# Patient Record
Sex: FEMALE | Race: BLACK OR AFRICAN AMERICAN | NOT HISPANIC OR LATINO
[De-identification: names, ages, dates, MRNs, and addresses within clinical notes are randomized per-mention and may not be internally consistent; named-entity substitution may affect disease eponyms.]

---

## 2017-01-04 ENCOUNTER — APPOINTMENT (OUTPATIENT)
Dept: BREAST CENTER | Facility: CLINIC | Age: 57
End: 2017-01-04

## 2017-01-04 VITALS — DIASTOLIC BLOOD PRESSURE: 86 MMHG | SYSTOLIC BLOOD PRESSURE: 142 MMHG | HEIGHT: 67 IN | HEART RATE: 78 BPM

## 2017-01-04 DIAGNOSIS — Z86.79 PERSONAL HISTORY OF OTHER DISEASES OF THE CIRCULATORY SYSTEM: ICD-10-CM

## 2017-01-04 DIAGNOSIS — Z15.01 GENETIC SUSCEPTIBILITY TO OTHER DISEASE: ICD-10-CM

## 2017-01-04 DIAGNOSIS — Z15.89 GENETIC SUSCEPTIBILITY TO OTHER DISEASE: ICD-10-CM

## 2017-01-04 DIAGNOSIS — N60.02 SOLITARY CYST OF LEFT BREAST: ICD-10-CM

## 2017-01-04 DIAGNOSIS — Z86.010 PERSONAL HISTORY OF COLONIC POLYPS: ICD-10-CM

## 2017-01-04 RX ORDER — LEVOTHYROXINE SODIUM 137 UG/1
137 TABLET ORAL
Refills: 0 | Status: ACTIVE | COMMUNITY

## 2017-01-04 RX ORDER — DICLOFENAC SODIUM 75 MG/1
75 TABLET, DELAYED RELEASE ORAL
Refills: 0 | Status: ACTIVE | COMMUNITY

## 2017-01-04 RX ORDER — PHENTERMINE HYDROCHLORIDE 30 MG/1
30 CAPSULE ORAL
Refills: 0 | Status: ACTIVE | COMMUNITY

## 2017-01-04 RX ORDER — AMLODIPINE BESYLATE 2.5 MG/1
2.5 TABLET ORAL
Refills: 0 | Status: ACTIVE | COMMUNITY

## 2017-01-09 ENCOUNTER — OTHER (OUTPATIENT)
Age: 57
End: 2017-01-09

## 2017-01-09 DIAGNOSIS — R92.8 OTHER ABNORMAL AND INCONCLUSIVE FINDINGS ON DIAGNOSTIC IMAGING OF BREAST: ICD-10-CM

## 2017-01-13 ENCOUNTER — OTHER (OUTPATIENT)
Age: 57
End: 2017-01-13

## 2017-01-17 ENCOUNTER — RESULT REVIEW (OUTPATIENT)
Age: 57
End: 2017-01-17

## 2017-01-19 ENCOUNTER — OTHER (OUTPATIENT)
Age: 57
End: 2017-01-19

## 2017-01-19 DIAGNOSIS — N28.89 OTHER SPECIFIED DISORDERS OF KIDNEY AND URETER: ICD-10-CM

## 2017-01-23 ENCOUNTER — OTHER (OUTPATIENT)
Age: 57
End: 2017-01-23

## 2017-01-25 ENCOUNTER — OUTPATIENT (OUTPATIENT)
Dept: OUTPATIENT SERVICES | Facility: HOSPITAL | Age: 57
LOS: 1 days | Discharge: HOME | End: 2017-01-25
Payer: COMMERCIAL

## 2017-01-25 ENCOUNTER — CLINICAL ADVICE (OUTPATIENT)
Age: 57
End: 2017-01-25

## 2017-01-25 DIAGNOSIS — N60.02 SOLITARY CYST OF LEFT BREAST: ICD-10-CM

## 2017-01-25 PROCEDURE — 93010 ELECTROCARDIOGRAM REPORT: CPT

## 2017-02-03 ENCOUNTER — OTHER (OUTPATIENT)
Age: 57
End: 2017-02-03

## 2017-02-03 DIAGNOSIS — K76.89 OTHER SPECIFIED DISEASES OF LIVER: ICD-10-CM

## 2017-02-03 DIAGNOSIS — K76.9 LIVER DISEASE, UNSPECIFIED: ICD-10-CM

## 2017-02-03 RX ORDER — FAMOTIDINE 10 MG/ML
20 INJECTION INTRAVENOUS ONCE
Qty: 0 | Refills: 0 | Status: COMPLETED | OUTPATIENT
Start: 2017-02-06 | End: 2017-02-06

## 2017-02-05 ENCOUNTER — RESULT REVIEW (OUTPATIENT)
Age: 57
End: 2017-02-05

## 2017-02-06 ENCOUNTER — OUTPATIENT (OUTPATIENT)
Dept: OUTPATIENT SERVICES | Facility: HOSPITAL | Age: 57
LOS: 1 days | Discharge: HOME | End: 2017-02-06
Payer: COMMERCIAL

## 2017-02-06 ENCOUNTER — APPOINTMENT (OUTPATIENT)
Dept: BREAST CENTER | Facility: HOSPITAL | Age: 57
End: 2017-02-06

## 2017-02-06 VITALS
HEIGHT: 67 IN | TEMPERATURE: 98 F | HEART RATE: 72 BPM | SYSTOLIC BLOOD PRESSURE: 137 MMHG | OXYGEN SATURATION: 98 % | DIASTOLIC BLOOD PRESSURE: 81 MMHG | WEIGHT: 220.46 LBS | RESPIRATION RATE: 16 BRPM

## 2017-02-06 VITALS
OXYGEN SATURATION: 98 % | HEART RATE: 85 BPM | DIASTOLIC BLOOD PRESSURE: 85 MMHG | RESPIRATION RATE: 15 BRPM | TEMPERATURE: 99 F | SYSTOLIC BLOOD PRESSURE: 146 MMHG

## 2017-02-06 DIAGNOSIS — M19.90 UNSPECIFIED OSTEOARTHRITIS, UNSPECIFIED SITE: ICD-10-CM

## 2017-02-06 DIAGNOSIS — E89.0 POSTPROCEDURAL HYPOTHYROIDISM: Chronic | ICD-10-CM

## 2017-02-06 DIAGNOSIS — I10 ESSENTIAL (PRIMARY) HYPERTENSION: ICD-10-CM

## 2017-02-06 DIAGNOSIS — Z98.890 OTHER SPECIFIED POSTPROCEDURAL STATES: Chronic | ICD-10-CM

## 2017-02-06 DIAGNOSIS — E78.00 PURE HYPERCHOLESTEROLEMIA, UNSPECIFIED: ICD-10-CM

## 2017-02-06 DIAGNOSIS — N60.09 SOLITARY CYST OF UNSPECIFIED BREAST: ICD-10-CM

## 2017-02-06 DIAGNOSIS — E03.9 HYPOTHYROIDISM, UNSPECIFIED: ICD-10-CM

## 2017-02-06 PROCEDURE — 88307 TISSUE EXAM BY PATHOLOGIST: CPT | Mod: 26

## 2017-02-06 RX ORDER — ONDANSETRON 8 MG/1
4 TABLET, FILM COATED ORAL EVERY 6 HOURS
Qty: 0 | Refills: 0 | Status: DISCONTINUED | OUTPATIENT
Start: 2017-02-06 | End: 2017-02-21

## 2017-02-06 RX ADMIN — FAMOTIDINE 20 MILLIGRAM(S): 10 INJECTION INTRAVENOUS at 08:11

## 2017-02-06 NOTE — ASU DISCHARGE PLAN (ADULT/PEDIATRIC). - NOTIFY
Fever greater than 101/Inability to Tolerate Liquids or Foods/Numbness, color, or temperature change to extremity/Bleeding that does not stop/Unable to Urinate/Pain not relieved by Medications/Swelling that continues/Persistent Nausea and Vomiting

## 2017-02-08 LAB — SURGICAL PATHOLOGY FINAL REPORT - CH: SIGNIFICANT CHANGE UP

## 2017-02-13 DIAGNOSIS — N60.02 SOLITARY CYST OF LEFT BREAST: ICD-10-CM

## 2017-02-13 DIAGNOSIS — N64.1 FAT NECROSIS OF BREAST: ICD-10-CM

## 2017-02-13 DIAGNOSIS — N60.92 UNSPECIFIED BENIGN MAMMARY DYSPLASIA OF LEFT BREAST: ICD-10-CM

## 2017-02-13 DIAGNOSIS — N60.12 DIFFUSE CYSTIC MASTOPATHY OF LEFT BREAST: ICD-10-CM

## 2017-02-13 DIAGNOSIS — E78.5 HYPERLIPIDEMIA, UNSPECIFIED: ICD-10-CM

## 2017-02-13 DIAGNOSIS — I10 ESSENTIAL (PRIMARY) HYPERTENSION: ICD-10-CM

## 2017-02-13 DIAGNOSIS — E03.9 HYPOTHYROIDISM, UNSPECIFIED: ICD-10-CM

## 2017-02-13 DIAGNOSIS — Z86.010 PERSONAL HISTORY OF COLONIC POLYPS: ICD-10-CM

## 2017-02-13 DIAGNOSIS — Z15.01 GENETIC SUSCEPTIBILITY TO MALIGNANT NEOPLASM OF BREAST: ICD-10-CM

## 2017-02-13 DIAGNOSIS — N60.22 FIBROADENOSIS OF LEFT BREAST: ICD-10-CM

## 2017-02-14 ENCOUNTER — APPOINTMENT (OUTPATIENT)
Dept: BREAST CENTER | Facility: CLINIC | Age: 57
End: 2017-02-14

## 2017-02-14 DIAGNOSIS — N60.99 UNSPECIFIED BENIGN MAMMARY DYSPLASIA OF UNSPECIFIED BREAST: ICD-10-CM

## 2017-02-14 DIAGNOSIS — Z09 ENCOUNTER FOR FOLLOW-UP EXAMINATION AFTER COMPLETED TREATMENT FOR CONDITIONS OTHER THAN MALIGNANT NEOPLASM: ICD-10-CM

## 2017-03-28 PROBLEM — M19.90 UNSPECIFIED OSTEOARTHRITIS, UNSPECIFIED SITE: Chronic | Status: ACTIVE | Noted: 2017-02-05

## 2017-03-28 PROBLEM — I10 ESSENTIAL (PRIMARY) HYPERTENSION: Chronic | Status: ACTIVE | Noted: 2017-02-05

## 2017-03-28 PROBLEM — E03.9 HYPOTHYROIDISM, UNSPECIFIED: Chronic | Status: ACTIVE | Noted: 2017-02-05

## 2017-03-28 PROBLEM — N63 UNSPECIFIED LUMP IN BREAST: Chronic | Status: ACTIVE | Noted: 2017-02-05

## 2017-03-28 PROBLEM — E78.5 HYPERLIPIDEMIA, UNSPECIFIED: Chronic | Status: ACTIVE | Noted: 2017-02-05

## 2017-04-06 ENCOUNTER — APPOINTMENT (OUTPATIENT)
Dept: UROLOGY | Facility: CLINIC | Age: 57
End: 2017-04-06

## 2017-04-06 VITALS
BODY MASS INDEX: 36.1 KG/M2 | WEIGHT: 230 LBS | HEIGHT: 67 IN | HEART RATE: 73 BPM | SYSTOLIC BLOOD PRESSURE: 148 MMHG | TEMPERATURE: 98.2 F | DIASTOLIC BLOOD PRESSURE: 88 MMHG | RESPIRATION RATE: 18 BRPM

## 2017-04-12 ENCOUNTER — INPATIENT (INPATIENT)
Facility: HOSPITAL | Age: 57
LOS: 1 days | Discharge: HOME | End: 2017-04-14
Attending: FAMILY MEDICINE | Admitting: FAMILY MEDICINE
Payer: COMMERCIAL

## 2017-04-12 VITALS — HEIGHT: 67 IN | WEIGHT: 229.94 LBS

## 2017-04-12 DIAGNOSIS — Z98.890 OTHER SPECIFIED POSTPROCEDURAL STATES: Chronic | ICD-10-CM

## 2017-04-12 DIAGNOSIS — E89.0 POSTPROCEDURAL HYPOTHYROIDISM: Chronic | ICD-10-CM

## 2017-04-12 PROCEDURE — 93010 ELECTROCARDIOGRAM REPORT: CPT

## 2017-04-12 RX ORDER — SODIUM CHLORIDE 9 MG/ML
1000 INJECTION INTRAMUSCULAR; INTRAVENOUS; SUBCUTANEOUS ONCE
Qty: 0 | Refills: 0 | Status: COMPLETED | OUTPATIENT
Start: 2017-04-12 | End: 2017-04-12

## 2017-04-12 RX ORDER — ONDANSETRON 8 MG/1
4 TABLET, FILM COATED ORAL ONCE
Qty: 0 | Refills: 0 | Status: COMPLETED | OUTPATIENT
Start: 2017-04-12 | End: 2017-04-12

## 2017-04-12 RX ORDER — SODIUM CHLORIDE 9 MG/ML
3 INJECTION INTRAMUSCULAR; INTRAVENOUS; SUBCUTANEOUS ONCE
Qty: 0 | Refills: 0 | Status: COMPLETED | OUTPATIENT
Start: 2017-04-12 | End: 2017-04-12

## 2017-04-12 RX ORDER — PANTOPRAZOLE SODIUM 20 MG/1
40 TABLET, DELAYED RELEASE ORAL ONCE
Qty: 0 | Refills: 0 | Status: COMPLETED | OUTPATIENT
Start: 2017-04-12 | End: 2017-04-12

## 2017-04-12 RX ADMIN — ONDANSETRON 4 MILLIGRAM(S): 8 TABLET, FILM COATED ORAL at 23:10

## 2017-04-12 RX ADMIN — PANTOPRAZOLE SODIUM 40 MILLIGRAM(S): 20 TABLET, DELAYED RELEASE ORAL at 23:10

## 2017-04-12 RX ADMIN — SODIUM CHLORIDE 3 MILLILITER(S): 9 INJECTION INTRAMUSCULAR; INTRAVENOUS; SUBCUTANEOUS at 23:10

## 2017-04-12 RX ADMIN — SODIUM CHLORIDE 1000 MILLILITER(S): 9 INJECTION INTRAMUSCULAR; INTRAVENOUS; SUBCUTANEOUS at 23:10

## 2017-04-12 NOTE — ED PROVIDER NOTE - DETAILS:
The scribe's documentation has been prepared under my direction and personally reviewed by me in its entirety. I confirm that the note above accurately reflects all work, treatment, procedures, and medical decision making performed by me (Dr. Stringer).

## 2017-04-12 NOTE — ED ADULT TRIAGE NOTE - CHIEF COMPLAINT QUOTE
Pt. to the ED C/O vomiting 3 times today- pt. states the 3 rd time she vomited, she saw blood-  Hx. of HTN and Thyroid

## 2017-04-12 NOTE — ED PROVIDER NOTE - ENMT, MLM
Airway patent, Nasal mucosa clear. Mildly dry mucous membranes. Throat has no vesicles, no oropharyngeal exudates and uvula is midline.

## 2017-04-12 NOTE — ED PROVIDER NOTE - MEDICAL DECISION MAKING DETAILS
58 yo BF p/w epigastric abd. pain, N/V, F after ate mac & cheese w/ saeed.  Pt vomited mult. times, latter such episodes w/ some blood associated.  + Periumbilical tender.  Labs (incl. cardiac enzymes, lipase), EKG, IVF, IV anti-N meds, IV Protonix, CXR, RUQ abd. sono. 56 yo BF p/w epigastric abd. pain, N/V, F after ate mac & cheese w/ saeed.  Pt vomited mult. times, latter such episodes w/ some blood associated.  + Periumbilical tender.  Labs (incl. cardiac enzymes, lipase), EKG, IVF, IV anti-N meds, IV Protonix, CXR, RUQ abd. sono.  Elevated WBC, LFTs, Alk Phos., RUQ sono + gallstone, borderline GB wall thickening:  IV Zosyn, pt for adm. to Med. for suspected acute cholecystitis.

## 2017-04-12 NOTE — ED PROVIDER NOTE - DIAGNOSIS COUNSELING, MDM
conducted a detailed discussion... I had a detailed discussion with the patient and  regarding the historical points, exam findings, and any diagnostic results supporting the admit diagnosis.

## 2017-04-12 NOTE — ED PROVIDER NOTE - CONSTITUTIONAL, MLM
normal... Black adult female, alert, no respiratory distress, nontoxic appearing. No sentence shortening.

## 2017-04-12 NOTE — ED PROVIDER NOTE - NS ED MD SCRIBE ATTENDING SCRIBE SECTIONS
PAST MEDICAL/SURGICAL/SOCIAL HISTORY/HISTORY OF PRESENT ILLNESS/PHYSICAL EXAM/RESULTS/PROGRESS NOTE/DISPOSITION/REVIEW OF SYSTEMS

## 2017-04-12 NOTE — ED PROVIDER NOTE - MUSCULOSKELETAL, MLM
Spine appears normal, range of motion is not limited, no muscle or joint tenderness. MAEx4. SLR bilateral 45degrees with out pain.

## 2017-04-12 NOTE — ED PROVIDER NOTE - OBJECTIVE STATEMENT
56 y/o female with PMhx of HTN, hypothyroid presents to the ED c/o resolved episode of epigastric pain that started around 6pm today after eating a box of of mac and cheese with saeed around 5pm. She describes the pain as sharp and pressure and states that she took pepto bismal with no resolve of symptoms. Pt then reports having episode of diaphoresis and subjective fever and tiffanie having 3 episodes of vomiting food material and then 4-5 episode of emesis with blood and food material. She states that the pain has resolved and denies chest pain, SOB, diarrhea, headache and back pain. Pt is scheduled to have removal of right kidney mass. PMD Dr. Gaona. 58 y/o female with PMhx of HTN, hypothyroid presents to the ED c/o resolved episode of epigastric pain that started around 6pm today after eating a box of of mac and cheese with saeed around 5pm. She describes the pain as sharp and pressure and states that she took pepto bismal with no resolve of symptoms. Pt then reports having episode of diaphoresis and subjective fever and tiffanie having 3 episodes of vomiting food material and then 4-5 episode of emesis with blood and food material. She states that the pain has resolved and denies chest pain, SOB, diarrhea, headache and back pain. Pt is scheduled to have removal of right kidney mass in a few months and reports normal BM this evening. PMD Dr. Gaona.

## 2017-04-12 NOTE — ED ADULT NURSE NOTE - OBJECTIVE STATEMENT
Pt presents to ED c/o sharp abd pain 9/10 with nausea and bloody emesis at home. Pt states she ate a snack she bought at the store around 4pm and shortly after began experiencing severe epigastric pain 9/10 and vomited multiple times. Pt states she first vomited the food but then had 3 episodes of bloody emesis. Pt states she also had a regular, nonbloody BM. Pt states after the vomiting the abd pain had stopped and upon arrival pt is not currently experiencing abd pain or nausea. Will continue to monitor.

## 2017-04-12 NOTE — ED PROVIDER NOTE - PROGRESS NOTE DETAILS
verbal radiology report: pt with +gallstone, equivocal gallbladder thickening at 5mm. complex right renal lesion. Verbal radiology report: pt with +gallstone, equivocal gallbladder thickening at 5mm. complex right renal lesion.

## 2017-04-13 DIAGNOSIS — K92.2 GASTROINTESTINAL HEMORRHAGE, UNSPECIFIED: ICD-10-CM

## 2017-04-13 DIAGNOSIS — I10 ESSENTIAL (PRIMARY) HYPERTENSION: ICD-10-CM

## 2017-04-13 DIAGNOSIS — E03.9 HYPOTHYROIDISM, UNSPECIFIED: ICD-10-CM

## 2017-04-13 DIAGNOSIS — E78.1 PURE HYPERGLYCERIDEMIA: ICD-10-CM

## 2017-04-13 DIAGNOSIS — M19.90 UNSPECIFIED OSTEOARTHRITIS, UNSPECIFIED SITE: ICD-10-CM

## 2017-04-13 DIAGNOSIS — K81.0 ACUTE CHOLECYSTITIS: ICD-10-CM

## 2017-04-13 LAB
ALBUMIN SERPL ELPH-MCNC: 3.7 G/DL — SIGNIFICANT CHANGE UP (ref 3.3–5)
ALBUMIN SERPL ELPH-MCNC: 4.3 G/DL — SIGNIFICANT CHANGE UP (ref 3.3–5)
ALP SERPL-CCNC: 143 U/L — HIGH (ref 40–120)
ALP SERPL-CCNC: 158 U/L — HIGH (ref 40–120)
ALT FLD-CCNC: 281 U/L — HIGH (ref 12–78)
ALT FLD-CCNC: 549 U/L — HIGH (ref 12–78)
ANION GAP SERPL CALC-SCNC: 7 MMOL/L — SIGNIFICANT CHANGE UP (ref 5–17)
ANION GAP SERPL CALC-SCNC: 7 MMOL/L — SIGNIFICANT CHANGE UP (ref 5–17)
APTT BLD: 33.3 SEC — SIGNIFICANT CHANGE UP (ref 27.5–37.4)
AST SERPL-CCNC: 481 U/L — HIGH (ref 15–37)
AST SERPL-CCNC: 486 U/L — HIGH (ref 15–37)
BASOPHILS # BLD AUTO: 0 K/UL — SIGNIFICANT CHANGE UP (ref 0–0.2)
BASOPHILS NFR BLD AUTO: 0.2 % — SIGNIFICANT CHANGE UP (ref 0–2)
BILIRUB DIRECT SERPL-MCNC: 0.2 MG/DL — SIGNIFICANT CHANGE UP (ref 0–0.2)
BILIRUB INDIRECT FLD-MCNC: 1 MG/DL — SIGNIFICANT CHANGE UP (ref 0.2–1)
BILIRUB SERPL-MCNC: 1 MG/DL — SIGNIFICANT CHANGE UP (ref 0.2–1.2)
BILIRUB SERPL-MCNC: 1.2 MG/DL — SIGNIFICANT CHANGE UP (ref 0.2–1.2)
BLD GP AB SCN SERPL QL: SIGNIFICANT CHANGE UP
BUN SERPL-MCNC: 13 MG/DL — SIGNIFICANT CHANGE UP (ref 7–23)
BUN SERPL-MCNC: 9 MG/DL — SIGNIFICANT CHANGE UP (ref 7–23)
CALCIUM SERPL-MCNC: 8.9 MG/DL — SIGNIFICANT CHANGE UP (ref 8.5–10.1)
CALCIUM SERPL-MCNC: 9.4 MG/DL — SIGNIFICANT CHANGE UP (ref 8.5–10.1)
CHLORIDE SERPL-SCNC: 105 MMOL/L — SIGNIFICANT CHANGE UP (ref 96–108)
CHLORIDE SERPL-SCNC: 109 MMOL/L — HIGH (ref 96–108)
CK SERPL-CCNC: 174 U/L — SIGNIFICANT CHANGE UP (ref 26–192)
CO2 SERPL-SCNC: 28 MMOL/L — SIGNIFICANT CHANGE UP (ref 22–31)
CO2 SERPL-SCNC: 29 MMOL/L — SIGNIFICANT CHANGE UP (ref 22–31)
CREAT SERPL-MCNC: 0.79 MG/DL — SIGNIFICANT CHANGE UP (ref 0.5–1.3)
CREAT SERPL-MCNC: 0.82 MG/DL — SIGNIFICANT CHANGE UP (ref 0.5–1.3)
EOSINOPHIL # BLD AUTO: 0 K/UL — SIGNIFICANT CHANGE UP (ref 0–0.5)
EOSINOPHIL NFR BLD AUTO: 0.1 % — SIGNIFICANT CHANGE UP (ref 0–6)
GLUCOSE SERPL-MCNC: 126 MG/DL — HIGH (ref 70–99)
GLUCOSE SERPL-MCNC: 84 MG/DL — SIGNIFICANT CHANGE UP (ref 70–99)
HCT VFR BLD CALC: 38 % — SIGNIFICANT CHANGE UP (ref 34.5–45)
HCT VFR BLD CALC: 38.7 % — SIGNIFICANT CHANGE UP (ref 34.5–45)
HGB BLD-MCNC: 12.3 G/DL — SIGNIFICANT CHANGE UP (ref 11.5–15.5)
HGB BLD-MCNC: 12.9 G/DL — SIGNIFICANT CHANGE UP (ref 11.5–15.5)
INR BLD: 1.02 RATIO — SIGNIFICANT CHANGE UP (ref 0.88–1.16)
LIDOCAIN IGE QN: 211 U/L — SIGNIFICANT CHANGE UP (ref 73–393)
LYMPHOCYTES # BLD AUTO: 1.4 K/UL — SIGNIFICANT CHANGE UP (ref 1–3.3)
LYMPHOCYTES # BLD AUTO: 12.3 % — LOW (ref 13–44)
MAGNESIUM SERPL-MCNC: 2.3 MG/DL — SIGNIFICANT CHANGE UP (ref 1.8–2.4)
MCHC RBC-ENTMCNC: 28.5 PG — SIGNIFICANT CHANGE UP (ref 27–34)
MCHC RBC-ENTMCNC: 29.4 PG — SIGNIFICANT CHANGE UP (ref 27–34)
MCHC RBC-ENTMCNC: 32.5 GM/DL — SIGNIFICANT CHANGE UP (ref 32–36)
MCHC RBC-ENTMCNC: 33.4 GM/DL — SIGNIFICANT CHANGE UP (ref 32–36)
MCV RBC AUTO: 87.7 FL — SIGNIFICANT CHANGE UP (ref 80–100)
MCV RBC AUTO: 88.1 FL — SIGNIFICANT CHANGE UP (ref 80–100)
MONOCYTES # BLD AUTO: 0.8 K/UL — SIGNIFICANT CHANGE UP (ref 0–0.9)
MONOCYTES NFR BLD AUTO: 7.3 % — SIGNIFICANT CHANGE UP (ref 2–14)
NEUTROPHILS # BLD AUTO: 9 K/UL — HIGH (ref 1.8–7.4)
NEUTROPHILS NFR BLD AUTO: 80.1 % — HIGH (ref 43–77)
PHOSPHATE SERPL-MCNC: 3.2 MG/DL — SIGNIFICANT CHANGE UP (ref 2.5–4.5)
PLATELET # BLD AUTO: 255 K/UL — SIGNIFICANT CHANGE UP (ref 150–400)
PLATELET # BLD AUTO: 258 K/UL — SIGNIFICANT CHANGE UP (ref 150–400)
POTASSIUM SERPL-MCNC: 3.5 MMOL/L — SIGNIFICANT CHANGE UP (ref 3.5–5.3)
POTASSIUM SERPL-MCNC: 3.9 MMOL/L — SIGNIFICANT CHANGE UP (ref 3.5–5.3)
POTASSIUM SERPL-SCNC: 3.5 MMOL/L — SIGNIFICANT CHANGE UP (ref 3.5–5.3)
POTASSIUM SERPL-SCNC: 3.9 MMOL/L — SIGNIFICANT CHANGE UP (ref 3.5–5.3)
PROT SERPL-MCNC: 7.4 GM/DL — SIGNIFICANT CHANGE UP (ref 6–8.3)
PROT SERPL-MCNC: 8.2 GM/DL — SIGNIFICANT CHANGE UP (ref 6–8.3)
PROTHROM AB SERPL-ACNC: 11 SEC — SIGNIFICANT CHANGE UP (ref 9.8–12.7)
RBC # BLD: 4.33 M/UL — SIGNIFICANT CHANGE UP (ref 3.8–5.2)
RBC # BLD: 4.4 M/UL — SIGNIFICANT CHANGE UP (ref 3.8–5.2)
RBC # FLD: 12.9 % — SIGNIFICANT CHANGE UP (ref 10.3–14.5)
RBC # FLD: 13.3 % — SIGNIFICANT CHANGE UP (ref 10.3–14.5)
SODIUM SERPL-SCNC: 141 MMOL/L — SIGNIFICANT CHANGE UP (ref 135–145)
SODIUM SERPL-SCNC: 144 MMOL/L — SIGNIFICANT CHANGE UP (ref 135–145)
TROPONIN I SERPL-MCNC: <0.015 NG/ML — SIGNIFICANT CHANGE UP (ref 0.01–0.04)
TYPE + AB SCN PNL BLD: SIGNIFICANT CHANGE UP
WBC # BLD: 11.3 K/UL — HIGH (ref 3.8–10.5)
WBC # BLD: 6.5 K/UL — SIGNIFICANT CHANGE UP (ref 3.8–10.5)
WBC # FLD AUTO: 11.3 K/UL — HIGH (ref 3.8–10.5)
WBC # FLD AUTO: 6.5 K/UL — SIGNIFICANT CHANGE UP (ref 3.8–10.5)

## 2017-04-13 PROCEDURE — 78226 HEPATOBILIARY SYSTEM IMAGING: CPT | Mod: 26

## 2017-04-13 PROCEDURE — 74181 MRI ABDOMEN W/O CONTRAST: CPT | Mod: 26

## 2017-04-13 PROCEDURE — 76705 ECHO EXAM OF ABDOMEN: CPT | Mod: 26

## 2017-04-13 PROCEDURE — 71010: CPT | Mod: 26

## 2017-04-13 PROCEDURE — 99285 EMERGENCY DEPT VISIT HI MDM: CPT

## 2017-04-13 RX ORDER — ACETAMINOPHEN 500 MG
650 TABLET ORAL EVERY 6 HOURS
Qty: 0 | Refills: 0 | Status: DISCONTINUED | OUTPATIENT
Start: 2017-04-13 | End: 2017-04-14

## 2017-04-13 RX ORDER — PHENTERMINE HCL 30 MG
1 CAPSULE ORAL
Qty: 0 | Refills: 0 | COMMUNITY

## 2017-04-13 RX ORDER — DEXTROSE MONOHYDRATE, SODIUM CHLORIDE, AND POTASSIUM CHLORIDE 50; .745; 4.5 G/1000ML; G/1000ML; G/1000ML
1000 INJECTION, SOLUTION INTRAVENOUS
Qty: 0 | Refills: 0 | Status: DISCONTINUED | OUTPATIENT
Start: 2017-04-13 | End: 2017-04-14

## 2017-04-13 RX ORDER — LEVOTHYROXINE SODIUM 125 MCG
125 TABLET ORAL DAILY
Qty: 0 | Refills: 0 | Status: DISCONTINUED | OUTPATIENT
Start: 2017-04-13 | End: 2017-04-14

## 2017-04-13 RX ORDER — DICLOFENAC SODIUM 75 MG/1
1 TABLET, DELAYED RELEASE ORAL
Qty: 0 | Refills: 0 | COMMUNITY

## 2017-04-13 RX ORDER — PIPERACILLIN AND TAZOBACTAM 4; .5 G/20ML; G/20ML
3.38 INJECTION, POWDER, LYOPHILIZED, FOR SOLUTION INTRAVENOUS EVERY 8 HOURS
Qty: 0 | Refills: 0 | Status: DISCONTINUED | OUTPATIENT
Start: 2017-04-13 | End: 2017-04-14

## 2017-04-13 RX ORDER — PIPERACILLIN AND TAZOBACTAM 4; .5 G/20ML; G/20ML
3.38 INJECTION, POWDER, LYOPHILIZED, FOR SOLUTION INTRAVENOUS ONCE
Qty: 0 | Refills: 0 | Status: COMPLETED | OUTPATIENT
Start: 2017-04-13 | End: 2017-04-13

## 2017-04-13 RX ORDER — PANTOPRAZOLE SODIUM 20 MG/1
40 TABLET, DELAYED RELEASE ORAL EVERY 12 HOURS
Qty: 0 | Refills: 0 | Status: DISCONTINUED | OUTPATIENT
Start: 2017-04-13 | End: 2017-04-14

## 2017-04-13 RX ORDER — AMLODIPINE BESYLATE 2.5 MG/1
2.5 TABLET ORAL DAILY
Qty: 0 | Refills: 0 | Status: DISCONTINUED | OUTPATIENT
Start: 2017-04-13 | End: 2017-04-14

## 2017-04-13 RX ORDER — ONDANSETRON 8 MG/1
4 TABLET, FILM COATED ORAL EVERY 6 HOURS
Qty: 0 | Refills: 0 | Status: DISCONTINUED | OUTPATIENT
Start: 2017-04-13 | End: 2017-04-14

## 2017-04-13 RX ADMIN — PIPERACILLIN AND TAZOBACTAM 200 GRAM(S): 4; .5 INJECTION, POWDER, LYOPHILIZED, FOR SOLUTION INTRAVENOUS at 02:39

## 2017-04-13 RX ADMIN — PIPERACILLIN AND TAZOBACTAM 25 GRAM(S): 4; .5 INJECTION, POWDER, LYOPHILIZED, FOR SOLUTION INTRAVENOUS at 21:47

## 2017-04-13 RX ADMIN — Medication 125 MICROGRAM(S): at 14:50

## 2017-04-13 RX ADMIN — PANTOPRAZOLE SODIUM 40 MILLIGRAM(S): 20 TABLET, DELAYED RELEASE ORAL at 17:09

## 2017-04-13 RX ADMIN — DEXTROSE MONOHYDRATE, SODIUM CHLORIDE, AND POTASSIUM CHLORIDE 125 MILLILITER(S): 50; .745; 4.5 INJECTION, SOLUTION INTRAVENOUS at 12:31

## 2017-04-13 RX ADMIN — AMLODIPINE BESYLATE 2.5 MILLIGRAM(S): 2.5 TABLET ORAL at 14:50

## 2017-04-13 RX ADMIN — PIPERACILLIN AND TAZOBACTAM 25 GRAM(S): 4; .5 INJECTION, POWDER, LYOPHILIZED, FOR SOLUTION INTRAVENOUS at 14:50

## 2017-04-13 NOTE — CONSULT NOTE ADULT - SUBJECTIVE AND OBJECTIVE BOX
Patient is a 57y old  Female who presents with a chief complaint of I had abdominal pain and threw up a bunch of times, 4-5 times with blood. (13 Apr 2017 11:29)      HPI:  57 F with Hx of Hypothyroidism and HTN came to the ED last evening with sudden onset of nausea, vomiting, abdominal pain. She was in her usual state of health and last night she ate some Mac and Cheese with [employer] around 5pm.  Shortly after, she developed diaphoresis, sweats, chills and sharp epigastric pain which she never experienced in the past.  She took Peptobismal and Tylenol without success.  She had a bowel movement and then after started vomiting about 3 times.  This progressed to vomiting of bright red blood a few times. She denies any prior Hx of similar symptoms, PUD.  She informed me that she is aware of having gallstones but never had an attack in the past.  She is scheduled to have surgery at [facility] in May with Dr. 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  Pt now feeling much better without any abd pain or nausea.    PAST MEDICAL HISTORY:  Hypothyroidism  HTN  Right Kidney mass - awaiting to have surgery with Dr. Sosa at [facility] in May  Knee and Hip Arthritis  Colon Polyps    PAST SURGICAL HISTORY:  s/p resection of mass in left breast in 2017 by Dr. Jaquez - path was benign    FAMILY HISTORY:  Mother - HTN, obesity  Father - obesity (13 Apr 2017 09:15)    PAST MEDICAL & SURGICAL HISTORY:  Breast nodule: left breast  Arthritis: b/l knees  Hypothyroid  Hypertension  Hyperlipidemia  H/O thyroidectomy  H/O arthroscopic knee surgery: right meniscus    MEDICATIONS  (STANDING):  piperacillin/tazobactam IVPB. 3.375Gram(s) IV Intermittent every 8 hours  dextrose 5% + sodium chloride 0.9% with potassium chloride 20 mEq/L 1000milliLiter(s) IV Continuous <Continuous>  pantoprazole  Injectable 40milliGRAM(s) IV Push every 12 hours    MEDICATIONS  (PRN):  ondansetron Injectable 4milliGRAM(s) IV Push every 6 hours PRN Nausea and/or Vomiting  acetaminophen   Tablet 650milliGRAM(s) Oral every 6 hours PRN For Temp greater than 38 C (100.4 F)    Allergies  No Known Allergies    SOCIAL HISTORY: no tob    FAMILY HISTORY: nc    REVIEW OF SYSTEMS:  CONSTITUTIONAL: No weakness, fevers or chills  EYES/ENT: No visual changes;  No vertigo or throat pain   NECK: No pain or stiffness  RESPIRATORY: No cough, wheezing, hemoptysis; No shortness of breath  CARDIOVASCULAR: No chest pain or palpitations  GASTROINTESTINAL: No abdominal or epigastric pain. No nausea, vomiting, or hematemesis; No diarrhea or constipation. No melena or hematochezia.  GENITOURINARY: No dysuria, frequency or hematuria  NEUROLOGICAL: No numbness or weakness  SKIN: No itching, burning, rashes, or lesions   PSYCH: Normal mood and affect  All other review of systems is negative unless indicated above.    Vital Signs Last 24 Hrs  T(C): 36.6, Max: 36.9 (04-13 @ 02:17)  T(F): 97.8, Max: 98.4 (04-13 @ 02:17)  HR: 77 (71 - 84)  BP: 134/78 (134/78 - 149/87)  BP(mean): --  RR: 16 (16 - 18)  SpO2: 97% (97% - 98%)    PHYSICAL EXAM:    Constitutional: NAD, well-developed  HEENT: MMM  Neck: No LAD  Respiratory: CTAB  Cardiovascular: S1 and S2, RRR, no M/G/R  Gastrointestinal: BS+, soft, NT/ND  Extremities: No peripheral edema  Vascular: 2+ peripheral pulses  Neurological: A/O x 3, no focal deficits  Psychiatric: Normal mood, normal affect  Skin: No rashes    LABS:                        12.9   11.3  )-----------( 258      ( 12 Apr 2017 22:23 )             38.7     04-12    141  |  105  |  13  ----------------------------<  126<H>  3.5   |  29  |  0.79    Ca    9.4      12 Apr 2017 22:23    TPro  8.2  /  Alb  4.3  /  TBili  1.0  /  DBili  x   /  AST  486<H>  /  ALT  281<H>  /  AlkPhos  143<H>  04-12    PT/INR - ( 12 Apr 2017 22:23 )   PT: 11.0 sec;   INR: 1.02 ratio         PTT - ( 12 Apr 2017 22:23 )  PTT:33.3 sec  LIVER FUNCTIONS - ( 12 Apr 2017 22:23 )  Alb: 4.3 g/dL / Pro: 8.2 gm/dL / ALK PHOS: 143 U/L / ALT: 281 U/L / AST: 486 U/L / GGT: x             RADIOLOGY & ADDITIONAL STUDIES:

## 2017-04-13 NOTE — H&P ADULT - ASSESSMENT
ACUTE ABDOMINAL PAIN, NAUSEA, VOMITING DUE TO CHOLELITHIASIS AND ACUTE CHOLECYSTITIS    ELEVATED LIVER TRANSAMINASES DUE TO ACUTE CHOLECYSTITIS    MILD SELF-LIMITED HEMATEMESIS, LIKELY FROM VOMITING, CURRENTLY HEMODYNAMCIALLY STABLE, R/O AYAZ-MARSH TEAR    HYPERTENSION    HYPOTHYORIDISM      PLAN:  -  admit to hospitalist, med-surg, inpatient  -  IVFs, NPO except meds  -  anti-emetics PRN, IV Zosyn, PRN Tylenol  -  repeat labs today and in am  -  monitor LFTs  -  follow up HIDA scan results  -  IV Protonix for now  -  GI consult  -  Surgery consult - Dr. Beebe  -  DVT prophylaxis - venodynes  -  Full Code    d/w patient, ED RN and PCP - Dr. Gaona ACUTE ABDOMINAL PAIN, NAUSEA, VOMITING DUE TO CHOLELITHIASIS AND ACUTE CHOLECYSTITIS    ELEVATED LIVER TRANSAMINASES DUE TO ACUTE CHOLECYSTITIS    S/P EPISODES OF HEMATEMESIS / UPPER GI BLEED FOLLOWING VOMITING, R/O AYAZ-MARSH TEAR. CURRENTLY HEMODYNAMICALLY STABLE AND H/H STABLE.  NO PRIOR HX OF UGIB OR PUD.    HYPERTENSION    HYPOTHYROIDISM    RIGHT KIDNEY MASS, AWAITING ELECTIVE RESECTION IN MAY AT [facility]      PLAN:  -  admit to hospitalist, med-surg, inpatient  -  IVFs, NPO except meds  -  anti-emetics PRN, IV Zosyn, PRN Tylenol  -  repeat labs today and in am  -  monitor LFTs  -  follow up HIDA scan results  -  IV Protonix q12  -  GI consult - Dr. Pimentel  -  Surgery consult - Dr. Beebe  -  DVT prophylaxis - venodynes, ambulate  -  Full Code    d/w patient, ED RN and PCP - Dr. Gaona ACUTE ABDOMINAL PAIN, NAUSEA, VOMITING DUE TO CHOLELITHIASIS AND ACUTE CHOLECYSTITIS    ELEVATED LIVER TRANSAMINASES DUE TO ACUTE CHOLECYSTITIS    S/P EPISODES OF HEMATEMESIS / UPPER GI BLEED FOLLOWING VOMITING, R/O AYAZ-MARSH TEAR. CURRENTLY HEMODYNAMICALLY STABLE AND H/H STABLE.  NO PRIOR HX OF UGIB OR PUD.    HYPERTENSION    HYPOTHYROIDISM    RIGHT KIDNEY MASS, AWAITING ELECTIVE RESECTION IN MAY AT [facility]    LEFT HEPATIC CYSTIC LESION WITH SEPTATIONS      PLAN:  -  admit to hospitalist, med-surg, inpatient  -  IVFs, NPO except meds  -  anti-emetics PRN, IV Zosyn, PRN Tylenol  -  repeat labs today and in am  -  monitor LFTs  -  follow up HIDA scan results  -  IV Protonix q12  -  GI consult - Dr. Pimentel  -  Surgery consult - Dr. Beebe  -  DVT prophylaxis - venodynes, ambulate  -  Full Code    d/w patient, ED RN and PCP - Dr. Gaona

## 2017-04-13 NOTE — CONSULT NOTE ADULT - ASSESSMENT
56yo female with nausea/vomiting and upper abd pain.  Pt with hematemesis after multiple episodes, consistent with Sarahy Mendieta tear.  Will follow h/h and continue PPI.  If evidence of ongoing bleeding, will need EGD.  Will repeat lfts and if elevated would check mrcp to r/o choledocholithiasis.  To consider ccy pending clinical course.

## 2017-04-13 NOTE — CONSULT NOTE ADULT - SUBJECTIVE AND OBJECTIVE BOX
CC:Patient is a 57y old  Female who presents with a chief complaint of I had abdominal pain and threw up a bunch of times, 4-5 times with blood. (13 Apr 2017 11:29)      Subjective:  Pt seen and examined at bedside with chaperone. Pt is AAOx3, pt in no acute distress. Pt had presented with c/o epigastric abd pain with associated nausea and bloody emesis (multiple) on 4/12/17. Pt denied any current c/o fever, chills, chest pain, SOB, abd pain, N/V/D, extremity pain or dysfunction, hemoptysis, hematemesis, hematuria, hematochexia, headache, diplopia, vertigo, dizzyness. Pt tolerating diet, (+) void, (+) ambulation, (+) bowel function    ROS:  hematemesis, nausea, abd pain, otherwise negative ROS    PMH: Hypothyroidism, HTN, Right Kidney mass  PSH: knee arthroscopy, breast biopsy, left hip surgery, ovarian cystectomy  Allergies: No Known Allergies  SH: denied etoh, tobacco, illicit drug use  FH: HTN, obesity        Vital Signs Last 24 Hrs  T(C): 36.7, Max: 36.9 (04-13 @ 02:17)  T(F): 98, Max: 98.4 (04-13 @ 02:17)  HR: 85 (71 - 85)  BP: 125/73 (125/73 - 181/87)  BP(mean): --  RR: 17 (16 - 18)  SpO2: 95% (95% - 98%)    Labs:      CARDIAC MARKERS ( 13 Apr 2017 07:26 )  <0.015 ng/mL / x     / x     / x     / x      CARDIAC MARKERS ( 13 Apr 2017 01:57 )  <0.015 ng/mL / x     / x     / x     / x      CARDIAC MARKERS ( 12 Apr 2017 22:23 )  <0.015 ng/mL / x     / 174 U/L / x     / x                            12.3   6.5   )-----------( 255      ( 13 Apr 2017 12:24 )             38.0     CBC Full  -  ( 13 Apr 2017 12:24 )  WBC Count : 6.5 K/uL  Hemoglobin : 12.3 g/dL  Hematocrit : 38.0 %  Platelet Count - Automated : 255 K/uL  Mean Cell Volume : 87.7 fl  Mean Cell Hemoglobin : 28.5 pg  Mean Cell Hemoglobin Concentration : 32.5 gm/dL  Auto Neutrophil # : x  Auto Lymphocyte # : x  Auto Monocyte # : x  Auto Eosinophil # : x  Auto Basophil # : x  Auto Neutrophil % : x  Auto Lymphocyte % : x  Auto Monocyte % : x  Auto Eosinophil % : x  Auto Basophil % : x    04-13    144  |  109<H>  |  9   ----------------------------<  84  3.9   |  28  |  0.82    Ca    8.9      13 Apr 2017 12:24  Phos  3.2     04-13  Mg     2.3     04-13    TPro  7.4  /  Alb  3.7  /  TBili  1.2  /  DBili  0.2  /  AST  481<H>  /  ALT  549<H>  /  AlkPhos  158<H>  04-13    LIVER FUNCTIONS - ( 13 Apr 2017 12:24 )  Alb: 3.7 g/dL / Pro: 7.4 gm/dL / ALK PHOS: 158 U/L / ALT: 549 U/L / AST: 481 U/L / GGT: x           PT/INR - ( 12 Apr 2017 22:23 )   PT: 11.0 sec;   INR: 1.02 ratio         PTT - ( 12 Apr 2017 22:23 )  PTT:33.3 sec      Meds:  ondansetron Injectable 4milliGRAM(s) IV Push every 6 hours PRN  acetaminophen   Tablet 650milliGRAM(s) Oral every 6 hours PRN  piperacillin/tazobactam IVPB. 3.375Gram(s) IV Intermittent every 8 hours  dextrose 5% + sodium chloride 0.9% with potassium chloride 20 mEq/L 1000milliLiter(s) IV Continuous <Continuous>  pantoprazole  Injectable 40milliGRAM(s) IV Push every 12 hours  amLODIPine   Tablet 2.5milliGRAM(s) Oral daily  levothyroxine 125MICROGram(s) Oral daily      Radiology:    EXAM:  MRI ABDOMEN WO CONTRAST                            PROCEDURE DATE:  04/13/2017        INTERPRETATION:  CLINICAL INFORMATION: Cholecystitis, now Elevated LFTs,   rule out CBD stone    COMPARISON: None.    PROCEDURE:   Multiplanar noncontrast MRI/MRCP was performed without intravenous   contrast. Radial and 3D MRCP sequences were obtained.    FINDINGS:    LIVER: 4 cm cyst with few thin septations in the left lobe. Subcentimeter   cyst in the right lobe.  BILE DUCTS: Normal caliber without evidence of choledocholithiasis or   obstructing mass.  GALLBLADDER: Multiple subcentimeter stones. The wall is mildly thickened,   no pericholecystic inflammatory changes or fluid.  SPLEEN: Within normal limits.  PANCREAS: Within normal limits.  ADRENALS: Within normal limits.  KIDNEYS/URETERS: 3.7 x 3.5 cm intrarenal lesion in the interpolar right   kidney extending into the renal sinus. It demonstrates hemorrhagic foci,   heterogeneously low T2 signal, diffusion restriction and no obvious   macroscopic fat.    VISUALIZED PORTIONS:    PERITONEUM: No ascites.  RETROPERITONEUM: No lymphadenopathy.      IMPRESSION: Cholelithiasis and mild gallbladder wall thickening. No   evidence of choledocholithiasis or significant biliary ductal dilatation.    3.7 cm worrisome solid right renal mass suspicious for renal cell   neoplasm.                NEIDA GAMA   This document has been electronically signed. Apr 13 2017  6:57PM    EXAM:  NM HEPATOBILIARY IMG                            PROCEDURE DATE:  04/13/2017        INTERPRETATION:  RADIOPHARMACEUTICAL: 3.3 mCi Tc-99m-Mebrofenin, I.V.    CLINICAL INFORMATION: 57 year-old female with right upper quadrant   abdominal pain and cholelithiasis; referred to evaluate for acute   cholecystitis    TECHNIQUE: Dynamic imaging of the anterior abdomen was performed for   approximately 1 hour following injection of radiotracer. Static images of   the abdomen in the anterior, right lateral and right anterior oblique   views were obtained immediately thereafter.    COMPARISON: No previous hepatobiliary scan for comparison    FINDINGS: There is prompt, homogeneous uptake of radiotracer by the   hepatocytes. Activity is first seen in the gallbladder at 20 minutes and   in the bowel at 35 minutes. There is good clearance of activity from the   liver by the end of the study.    IMPRESSION: Normal hepatobiliary scan.    No scan evidence of acute cholecystitis.        KAMINI DE SOUZA   This document has been electronically signed. Apr 13 2017 10:22AM    EXAM:  US ABDOMEN LIMITED                            PROCEDURE DATE:  04/13/2017        INTERPRETATION:  INDICATION: Nausea, vomiting, and right upper quadrant   pain. Elevated LFTs. Right renal lesion.    COMPARISON: None.    TECHNIQUE: Real time gray scale evaluation of the right upper quadrant   was performed and submitted for interpretation.    FINDINGS:     The liver is enlarged to 18.2 cm. There is a complex cystic structure in   the left hepatic lobe containing septations measuring 3.9 x2.2 x 3.3 cm   without internal flow.     Shadowing gallstone is present. There is focal gallbladder wall   thickening at 6 mm. Sonographic Batres sign cannot be assessed as patient   was premedicated. Proximal common duct measures 2 mm.    The main portal vein is patent and demonstrates hepatopedal flow with   respiratory phasicity.    Right kidney measures 12.1 cm in length. There is a hyperechogenic lesion   in the upper pole the right kidney measuring 4.5 x 2.9 x 4.1 cm.    Pancreatic body and neck are partially obscured by overlying bowel gas.     Proximal aorta and IVC are unremarkable.    IMPRESSION:     Cholelithiasis with equivocal findings of cholecystitis. Consider HIDA   scan for further evaluation.    Indeterminate 4.5 cm hyperechogenic upper right renal lesion. Further   evaluation with CT or MRI is recommended.    Complex 4 cm left hepatic cystic lesion with septations. Hepatomegaly.    Findings and recommendations were discussed with Dr. Stringer at 2:15 AM   on 4/13/2017 with read back.      DAYTON MARTINEZ   This document has been electronically signed. Apr 13 2017  2:18AM    Physical exam:  Pt is AAOx3  Pt in no acute distress  HEENT: Normocephalic, atraumatic, STELLA, EOM wnl  Neck: No crepitus, no ecchymosis, no hematoma, to exam, no JVD, no tracheal deviation  Cardiovascular: S1S2 Present  Respiratory: Respiratory Effort normal; no wheezes, rales or rhonchi to exam, CTAB  ABD: bowel sounds (+), soft, nontender, non distended, no rebound, no guarding, no rigidity, no skin changes to exam. No pelvic instability to exam, no skin changes, negative batres's sign to exam  Musculoskeletal:All digits are warm and well perfused. Pt demonstrates grossly intact sensoromotor function. Pt has good capillary refill to digits, no calf edema or tenderness to exam.  Skin: no jaundice or icteric sclera to exam b/l, no skin changes to exam

## 2017-04-13 NOTE — CONSULT NOTE ADULT - SUBJECTIVE AND OBJECTIVE BOX
CHIEF COMPLAINT:  Patient is a 57y old  Female who presents with a chief complaint of I had abdominal pain and threw up a bunch of times, 4-5 times with blood. (2017 11:29)      HPI: 17:  57 F with Hx of Hypothyroidism s/p thyroidectomy in  for a goiter and h/o HTN and hyperlipidemia, came to the ED 17 with sudden onset of nausea, vomiting, abdominal pain. She was in her usual state of health until she ate some Mac and Cheese with Lopez Bits around 5pm on 17.  Shortly after, she developed diaphoresis, sweats, chills and sharp epigastric pain which she never experienced in the past.  She took Pepto-Bismal and Tylenol without success.  She had a bowel movement and then after started vomiting about 3 times.  This progressed to vomiting of bright red blood a few times. She denies any prior Hx of similar symptoms, PUD.  She informed me that she is aware of having gallstones but never had an attack in the past.  She is scheduled to have surgery at [facility] in May with Dr. 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    PAST MEDICAL HISTORY:  Hyperlipidemia  Hypothyroidism  HTN  Right Kidney mass - awaiting to have surgery with Dr. Sosa at [facility] in May  Knee and Hip Arthritis-s/p injection Rx-DR Vicki MOSCOSO- Dr Sean HODGSON (+)    Colon Ufcnlj-glieuibfjjx-Mx Andrea Sachnoff16    PAST SURGICAL HISTORY:  [facility] ? [facility]  s/p right knee arthroscopic surgery for meniscus tear-Dr Thakkar-14  s/p thyroidectomy for goiter-    FAMILY HISTORY:  Mother - HTN, obesity  Father - obesity (2017 09:15)        ALLERGIES:  Allergies  No Known Allergies      REVIEW OF SYSTEMS:    CONSTITUTIONAL: No weakness, fevers or chills  EYES/ENT: No visual changes;  No vertigo or throat pain   NECK: No pain or stiffness  RESPIRATORY: No cough, wheezing, hemoptysis; No shortness of breath  CARDIOVASCULAR: No chest pain or palpitations  GENITOURINARY: No dysuria, frequency or hematuria  NEUROLOGICAL: No numbness or weakness  SKIN: No itching, burning, rashes, or lesions   All other review of systems is negative unless indicated above    Vital Signs Last 24 Hrs  T(C): 36.7, Max: 36.9 ( @ 02:17)  T(F): 98, Max: 98.4 ( @ 02:17)  HR: 85 (71 - 85)  BP: 125/73 (125/73 - 181/87)  BP(mean): --  RR: 17 (16 - 18)  SpO2: 95% (95% - 98%)    I&O's Summary    I & Os for current day (as of 2017 18:22)  =============================================  IN: 270 ml / OUT: 0 ml / NET: 270 ml      PHYSICAL EXAM:   Constitutional: NAD, awake and alert, well-developed  HEENT: PERR, EOMI, Normal Hearing, MMM  Neck: Soft and supple, No LAD, No JVD  Respiratory: Breath sounds are clear bilaterally, No wheezing, rales or rhonchi  Cardiovascular: S1 and S2, regular rate and rhythm, soft VIVIANA at base; No gallops or rubs  Gastrointestinal: Bowel Sounds present, soft, nontender, nondistended, no guarding, no rebound  Extremities: No peripheral edema  Vascular: 2+ peripheral pulses  Neurological: A/O x 3, no focal deficits  Musculoskeletal: 5/5 strength b/l upper and lower extremities  Skin: No rashes      MEDICATIONS  (STANDING):  piperacillin/tazobactam IVPB. 3.375Gram(s) IV Intermittent every 8 hours  dextrose 5% + sodium chloride 0.9% with potassium chloride 20 mEq/L 1000milliLiter(s) IV Continuous <Continuous>  pantoprazole  Injectable 40milliGRAM(s) IV Push every 12 hours  amLODIPine   Tablet 2.5milliGRAM(s) Oral daily  levothyroxine 125MICROGram(s) Oral daily      LABS: All Labs Reviewed:                        12.3   6.5   )-----------( 255      ( 2017 12:24 )             38.0     04-13    144  |  109<H>  |  9   ----------------------------<  84  3.9   |  28  |  0.82    Ca    8.9      2017 12:24  Phos  3.2     -  Mg     2.3         TPro  7.4  /  Alb  3.7  /  TBili  1.2  /  DBili  0.2  /  AST  481<H>  /  ALT  549<H>  /  AlkPhos  158<H>      PT/INR - ( 2017 22:23 )   PT: 11.0 sec;   INR: 1.02 ratio         PTT - ( 2017 22:23 )  PTT:33.3 sec  CARDIAC MARKERS ( 2017 07:26 )  <0.015 ng/mL / x     / x     / x     / x      CARDIAC MARKERS ( 2017 01:57 )  <0.015 ng/mL / x     / x     / x     / x      CARDIAC MARKERS ( 2017 22:23 )  <0.015 ng/mL / x     / 174 U/L / x     / x          RADIOLOGY: Abd U/S: 17:  Cholelithiasis with equivocal findings of cholecystitis. Consider HIDA   scan for further evaluation.  Indeterminate 4.5 cm hyperechogenic upper right renal lesion. Further   evaluation with CT or MRI is recommended.  Complex 4 cm left hepatic cystic lesion with septations. Hepatomegaly.    HIDA Scan: 17:  Normal hepatobiliary scan.  No scan evidence of acute cholecystitis.    EK17  Normal sinus rhythm  Minimal voltage criteria for LVH, may be normal variant  Borderline ECG  When compared with ECG of 2017 07:42,  No significant change was found    TELEMETRY:      ECHO: CHIEF COMPLAINT:  Patient is a 57y old  Female who presents with a chief complaint of I had abdominal pain and threw up a bunch of times, 4-5 times with blood. (2017 11:29)      HPI: 17:  57 F with Hx of Hypothyroidism s/p thyroidectomy in  for a goiter and h/o HTN and hyperlipidemia, came to the ED 17 with sudden onset of nausea, vomiting, abdominal pain. She was in her usual state of health until she ate some Mac and Cheese with Lopez Bits around 5pm on 17.  Shortly after, she developed diaphoresis, sweats, chills and sharp epigastric pain which she never experienced in the past.  She took Pepto-Bismal and Tylenol without success.  She had a bowel movement and then after started vomiting about 3 times.  This progressed to vomiting of bright red blood a few times. She denies any prior Hx of similar symptoms, PUD.  She informed me that she is aware of having gallstones but never had an attack in the past.  She is scheduled to have surgery at [facility] in May with Dr. 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  She had a stress echo in my office on 17 showing normal LV systolic function and LVEF=60-65% with mild MVP and mild MR and TR but no evidence for ischemia.    PAST MEDICAL HISTORY:  Hyperlipidemia  Hypothyroidism  HTN  Right Kidney mass - awaiting to have surgery with Dr. Sosa at [facility] in May  Knee and Hip Arthritis-s/p injection Rx-DR Vicki MOSCOSO- Dr Sean HODGSON (+)    Colon Tkhxlx-dfaxfothowl-Ob Andrea Sachnoff16    PAST SURGICAL HISTORY:  s/p resection of mass in left breast-2017-Dr. Jaquez - path was ? pre-cancerous  s/p right knee arthroscopic surgery for meniscus tear-Dr Thakkar-14  s/p thyroidectomy for goiter-    FAMILY HISTORY:  Mother - HTN, obesity  Father - obesity (2017 09:15)        ALLERGIES:  Allergies  No Known Allergies      REVIEW OF SYSTEMS:    CONSTITUTIONAL: No weakness, fevers or chills  EYES/ENT: No visual changes;  No vertigo or throat pain   NECK: No pain or stiffness  RESPIRATORY: No cough, wheezing, hemoptysis; No shortness of breath  CARDIOVASCULAR: No chest pain or palpitations  GENITOURINARY: No dysuria, frequency or hematuria  NEUROLOGICAL: No numbness or weakness  SKIN: No itching, burning, rashes, or lesions   All other review of systems is negative unless indicated above    Vital Signs Last 24 Hrs  T(C): 36.7, Max: 36.9 ( @ 02:17)  T(F): 98, Max: 98.4 ( @ 02:17)  HR: 85 (71 - 85)  BP: 125/73 (125/73 - 181/87)  BP(mean): --  RR: 17 (16 - 18)  SpO2: 95% (95% - 98%)    I&O's Summary    I & Os for current day (as of 2017 18:22)  =============================================  IN: 270 ml / OUT: 0 ml / NET: 270 ml      PHYSICAL EXAM:   Constitutional: NAD, awake and alert, well-developed  HEENT: PERR, EOMI, Normal Hearing, MMM  Neck: Soft and supple, No LAD, No JVD  Respiratory: Breath sounds are clear bilaterally, No wheezing, rales or rhonchi  Cardiovascular: S1 and S2, regular rate and rhythm, soft VIVIANA at base; No gallops or rubs  Gastrointestinal: Bowel Sounds present, soft, nontender, nondistended, no guarding, no rebound  Extremities: No peripheral edema  Vascular: 2+ peripheral pulses  Neurological: A/O x 3, no focal deficits  Musculoskeletal: 5/5 strength b/l upper and lower extremities  Skin: No rashes      MEDICATIONS  (STANDING):  piperacillin/tazobactam IVPB. 3.375Gram(s) IV Intermittent every 8 hours  dextrose 5% + sodium chloride 0.9% with potassium chloride 20 mEq/L 1000milliLiter(s) IV Continuous <Continuous>  pantoprazole  Injectable 40milliGRAM(s) IV Push every 12 hours  amLODIPine   Tablet 2.5milliGRAM(s) Oral daily  levothyroxine 125MICROGram(s) Oral daily      LABS: All Labs Reviewed:                        12.3   6.5   )-----------( 255      ( 2017 12:24 )             38.0     -13    144  |  109<H>  |  9   ----------------------------<  84  3.9   |  28  |  0.82    Ca    8.9      2017 12:24  Phos  3.2     -  Mg     2.3     -    TPro  7.4  /  Alb  3.7  /  TBili  1.2  /  DBili  0.2  /  AST  481<H>  /  ALT  549<H>  /  AlkPhos  158<H>      PT/INR - ( 2017 22:23 )   PT: 11.0 sec;   INR: 1.02 ratio         PTT - ( 2017 22:23 )  PTT:33.3 sec  CARDIAC MARKERS ( 2017 07:26 )  <0.015 ng/mL / x     / x     / x     / x      CARDIAC MARKERS ( 2017 01:57 )  <0.015 ng/mL / x     / x     / x     / x      CARDIAC MARKERS ( 2017 22:23 )  <0.015 ng/mL / x     / 174 U/L / x     / x          RADIOLOGY: Abd U/S: 17:  Cholelithiasis with equivocal findings of cholecystitis. Consider HIDA   scan for further evaluation.  Indeterminate 4.5 cm hyperechogenic upper right renal lesion. Further   evaluation with CT or MRI is recommended.  Complex 4 cm left hepatic cystic lesion with septations. Hepatomegaly.    HIDA Scan: 17:  Normal hepatobiliary scan.  No scan evidence of acute cholecystitis.    EK17  Normal sinus rhythm  Minimal voltage criteria for LVH, may be normal variant  Borderline ECG  When compared with ECG of 2017 07:42,  No significant change was found    TELEMETRY:      ECHO:  Stress echo in my office on 17 showing normal LV systolic function and LVEF=60-65% with mild MVP and mild MR and TR but no evidence for ischemia.

## 2017-04-13 NOTE — ED ADULT NURSE REASSESSMENT NOTE - NS ED NURSE REASSESS COMMENT FT1
Pt sent to nuclear medicine for imaging scan. pt stable at time of transport. Report given to [facility] Reggie Zuniga, pt to be transferred directly from Nuclear medicine to [facility]. Nuculear medicine contacted and verbalzied understanding.

## 2017-04-13 NOTE — ED ADULT NURSE REASSESSMENT NOTE - NS ED NURSE REASSESS COMMENT FT1
Pt received at 0700, alert and orientedx4, VSS afebrile, pt resting comfortably in bed, no compplaints at this time, All needs anticipated and met, safety maintained will continue to monitor

## 2017-04-13 NOTE — H&P ADULT - HISTORY OF PRESENT ILLNESS
57 F     PAST MEDICAL HISTORY:  Hypothyroidism  HTN    PAST SURGICAL HISTORY:    FAMILY HISTORY:   non-contributory to the patient's current presentation 57 F with Hx of Hypothyroidism and HTN came to the ED last evening with sudden onset of nausea, vomiting, abdominal pain. She was in her usual state of health and last night she ate some Mac and Cheese with Lopez Bits around 5pm.  Shortly after, she developed diaphoresis, sweats, chills and sharp epigastric pain which she never experienced in the past.  She took Peptobismal and Tylenol without success.  She had a bowel movement and then after started vomiting about 3 times.  This progressed to vomiting of bright red blood a few times. She denies any prior Hx of similar symptoms, PUD.  She informed me that she is aware of having gallstones but never had an attack in the past.  She is scheduled to have surgery at [facility] in May with Dr. 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    PAST MEDICAL HISTORY:  Hypothyroidism  HTN  Right Kidney mass - awaiting to have surgery with Dr. Sosa at [facility] in May  Knee and Hip Arthritis  Colon Polyps    PAST SURGICAL HISTORY:  s/p resection of mass in left breast in 2017 by Dr. Jaquez - path was benign    FAMILY HISTORY:  Mother - HTN, obesity  Father - obesity 57 F with Hx of Hypothyroidism and HTN came to the ED last evening with sudden onset of nausea, vomiting, abdominal pain. She was in her usual state of health and last night she ate some Mac and Cheese with Lopez Bits around 5pm.  Shortly after, she developed diaphoresis, sweats, chills and sharp epigastric pain which she never experienced in the past.  She took Peptobismal and Tylenol without success.  She had a bowel movement and then after started vomiting about 3 times.  This progressed to vomiting of bright red blood a few times. She denies any prior Hx of similar symptoms, PUD.  She informed me that she is aware of having gallstones but never had an attack in the past.  She is scheduled to have surgery at [facility] in May with Dr. 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    PAST MEDICAL HISTORY:  Hypothyroidism  HTN  Right Kidney mass - awaiting to have surgery with Dr. Sosa at [facility] in May  Knee and Hip Arthritis  Colon Polyps    PAST SURGICAL HISTORY:  s/p resection of mass in left breast in 2017 by Dr. Jaquez - path was benign    FAMILY HISTORY:  Mother - HTN, obesity  Father - obesity

## 2017-04-13 NOTE — H&P ADULT - NSHPLABSRESULTS_GEN_ALL_CORE
12.9   11.3  )-----------( 258      ( 12 Apr 2017 22:23 )             38.7     04-12    141  |  105  |  13  ----------------------------<  126<H>  3.5   |  29  |  0.79    Ca    9.4      12 Apr 2017 22:23    TPro  8.2  /  Alb  4.3  /  TBili  1.0  /  DBili  x   /  AST  486<H>  /  ALT  281<H>  /  AlkPhos  143<H>  04-12    CARDIAC MARKERS ( 13 Apr 2017 07:26 )  <0.015 ng/mL / x     / x     / x     / x      CARDIAC MARKERS ( 13 Apr 2017 01:57 )  <0.015 ng/mL / x     / x     / x     / x      CARDIAC MARKERS ( 12 Apr 2017 22:23 )  <0.015 ng/mL / x     / 174 U/L / x     / x        LIVER FUNCTIONS - ( 12 Apr 2017 22:23 )  Alb: 4.3 g/dL / Pro: 8.2 gm/dL / ALK PHOS: 143 U/L / ALT: 281 U/L / AST: 486 U/L / GGT: x           PT/INR - ( 12 Apr 2017 22:23 )   PT: 11.0 sec;   INR: 1.02 ratio      PTT - ( 12 Apr 2017 22:23 )  PTT:33.3 sec    EKG - NSR, LVH, LAD, normal RW progression, horizontal rotation, no acute ischemic changes      RADIOLOGY:    ABDOMINAL US:    IMPRESSION:     Cholelithiasis with equivocal findings of cholecystitis. Consider HIDA   scan for further evaluation.    Indeterminate 4.5 cm hyperechogenic upper right renal lesion. Further   evaluation with CT or MRI is recommended.    Complex 4 cm left hepatic cystic lesion with septations. Hepatomegaly.    Findings and recommendations were discussed with Dr. Stringer at 2:15 AM   on 4/13/2017 with read back.      CXR -     EXAM:  CHEST SINGLE VIEW FRONTAL                            PROCEDURE DATE:  04/13/2017        INTERPRETATION:  Exam Date: 4/13/2017 12:39 AM    History: Vomiting blood    Technique: Single frontal portable view of the chest with no prior   studiesavailable for comparison    Findings:    The heart is normal in size.  The lungs are grossly clear. The apices and   hemidiaphragms are unremarkable. Degenerative changes of the visualized   osseous structures.    Impression:    No acute disease      ZEKE CONNELL M.D., ATTENDING RADIOLOGIST  This document has been electronically signed. Apr 13 2017  8:35AM

## 2017-04-13 NOTE — CONSULT NOTE ADULT - ASSESSMENT
4/13/17:  Pt with above history and GI process.  Abd US suspicious of acute cholecystitis but HIDA (-).  Hematemesis likely due to excessive vomiting.  NO cardiac issues at this time and stable from a cardiac standpoint for treatment as per medicine, GI and surgery if needed.  Will follow as work-up and treatment progresses.

## 2017-04-13 NOTE — H&P ADULT - NSHPPHYSICALEXAM_GEN_ALL_CORE
HEENT:  pupils equal and reactive, EOMI, no oropharyngeal lesions, erythema, exudates, oral thrush    NECK:   supple, no carotid bruits, no palpable lymph nodes, no thyromegaly    CV:  +S1, +S2, regular, no murmurs or rubs    RESP:   lungs clear to auscultation bilaterally, no wheezing, rales, rhonchi, good air entry bilaterally    BREAST:  not examined    GI:  abdomen soft, non-tender, non-distended, normal BS, no bruits, no abdominal masses, no palpable masses    RECTAL:  not examined    :  not examined    MSK:   normal muscle tone, no atrophy, no rigidity, no contractions    EXT:   no clubbing, no cyanosis, no edema, no calf pain, swelling or erythema    VASCULAR:  pulses equal and symmetric in the upper and lower extremities    NEURO:  AAOX3, no focal neurological deficits, follows all commands, able to move extremities spontaneously    SKIN:  no ulcers, lesions or rashes

## 2017-04-13 NOTE — CONSULT NOTE ADULT - ASSESSMENT
A/P:  Renal mass/neoplasm  Biliary colic  Negative studies to date for acute cholecystitis  Serial abd exams  F/U labs  Pain control  Advise GI/DVT prophylaxis  No acute emergent general surgical intervention anticipated for pt at this time  Medical management per primary service  GI on consult  Cont current care and meds

## 2017-04-14 ENCOUNTER — TRANSCRIPTION ENCOUNTER (OUTPATIENT)
Age: 57
End: 2017-04-14

## 2017-04-14 VITALS
DIASTOLIC BLOOD PRESSURE: 80 MMHG | OXYGEN SATURATION: 97 % | SYSTOLIC BLOOD PRESSURE: 133 MMHG | TEMPERATURE: 98 F | HEART RATE: 76 BPM

## 2017-04-14 LAB
ALBUMIN SERPL ELPH-MCNC: 3.6 G/DL — SIGNIFICANT CHANGE UP (ref 3.3–5)
ALP SERPL-CCNC: 142 U/L — HIGH (ref 40–120)
ALT FLD-CCNC: 371 U/L — HIGH (ref 12–78)
ANION GAP SERPL CALC-SCNC: 5 MMOL/L — SIGNIFICANT CHANGE UP (ref 5–17)
AST SERPL-CCNC: 168 U/L — HIGH (ref 15–37)
BILIRUB SERPL-MCNC: 1.1 MG/DL — SIGNIFICANT CHANGE UP (ref 0.2–1.2)
BUN SERPL-MCNC: 7 MG/DL — SIGNIFICANT CHANGE UP (ref 7–23)
CALCIUM SERPL-MCNC: 8.9 MG/DL — SIGNIFICANT CHANGE UP (ref 8.5–10.1)
CHLORIDE SERPL-SCNC: 111 MMOL/L — HIGH (ref 96–108)
CO2 SERPL-SCNC: 27 MMOL/L — SIGNIFICANT CHANGE UP (ref 22–31)
CREAT SERPL-MCNC: 0.83 MG/DL — SIGNIFICANT CHANGE UP (ref 0.5–1.3)
GLUCOSE SERPL-MCNC: 107 MG/DL — HIGH (ref 70–99)
HAV IGM SER-ACNC: SIGNIFICANT CHANGE UP
HBV CORE IGM SER-ACNC: SIGNIFICANT CHANGE UP
HBV SURFACE AG SER-ACNC: SIGNIFICANT CHANGE UP
HCT VFR BLD CALC: 38 % — SIGNIFICANT CHANGE UP (ref 34.5–45)
HCV AB S/CO SERPL IA: 0.15 S/CO — SIGNIFICANT CHANGE UP
HCV AB SERPL-IMP: SIGNIFICANT CHANGE UP
HGB BLD-MCNC: 12.4 G/DL — SIGNIFICANT CHANGE UP (ref 11.5–15.5)
MCHC RBC-ENTMCNC: 29.1 PG — SIGNIFICANT CHANGE UP (ref 27–34)
MCHC RBC-ENTMCNC: 32.6 GM/DL — SIGNIFICANT CHANGE UP (ref 32–36)
MCV RBC AUTO: 89.3 FL — SIGNIFICANT CHANGE UP (ref 80–100)
PLATELET # BLD AUTO: 238 K/UL — SIGNIFICANT CHANGE UP (ref 150–400)
POTASSIUM SERPL-MCNC: 4 MMOL/L — SIGNIFICANT CHANGE UP (ref 3.5–5.3)
POTASSIUM SERPL-SCNC: 4 MMOL/L — SIGNIFICANT CHANGE UP (ref 3.5–5.3)
PROT SERPL-MCNC: 7.3 GM/DL — SIGNIFICANT CHANGE UP (ref 6–8.3)
RBC # BLD: 4.25 M/UL — SIGNIFICANT CHANGE UP (ref 3.8–5.2)
RBC # FLD: 13.4 % — SIGNIFICANT CHANGE UP (ref 10.3–14.5)
SODIUM SERPL-SCNC: 143 MMOL/L — SIGNIFICANT CHANGE UP (ref 135–145)
WBC # BLD: 5.3 K/UL — SIGNIFICANT CHANGE UP (ref 3.8–10.5)
WBC # FLD AUTO: 5.3 K/UL — SIGNIFICANT CHANGE UP (ref 3.8–10.5)

## 2017-04-14 RX ADMIN — PANTOPRAZOLE SODIUM 40 MILLIGRAM(S): 20 TABLET, DELAYED RELEASE ORAL at 05:14

## 2017-04-14 RX ADMIN — PIPERACILLIN AND TAZOBACTAM 25 GRAM(S): 4; .5 INJECTION, POWDER, LYOPHILIZED, FOR SOLUTION INTRAVENOUS at 05:14

## 2017-04-14 RX ADMIN — Medication 125 MICROGRAM(S): at 06:36

## 2017-04-14 RX ADMIN — AMLODIPINE BESYLATE 2.5 MILLIGRAM(S): 2.5 TABLET ORAL at 05:13

## 2017-04-14 NOTE — PROGRESS NOTE ADULT - ASSESSMENT
A/P:  Renal mass/neoplasm  Biliary colic  Negative studies to suggest acute cholecystitis  Serial abd exams benign  F/U labs  Pain control  Advise GI/DVT prophylaxis  No acute emergent general surgical intervention anticipated for pt at this time  Pt wants to have elective cholecystectomy at time of renal surgery at [facility] within coming weeks. Pt advised to seek general surgeon at [facility] for cholecystectomy  Medical management per primary service  GI on consult  Cont current care and meds  Pt stable from surgical standpoint

## 2017-04-14 NOTE — PROGRESS NOTE ADULT - SUBJECTIVE AND OBJECTIVE BOX
CHIEF COMPLAINT:  Patient is a 57y old  Female who presents with a chief complaint of I had abdominal pain and threw up a bunch of times, 4-5 times with blood. (2017 11:29)      HPI: 17:  57 F with Hx of Hypothyroidism s/p thyroidectomy in  for a goiter and h/o HTN and hyperlipidemia, came to the ED 17 with sudden onset of nausea, vomiting, abdominal pain. She was in her usual state of health until she ate some Mac and Cheese with Lopez Bits around 5pm on 17.  Shortly after, she developed diaphoresis, sweats, chills and sharp epigastric pain which she never experienced in the past.  She took Pepto-Bismal and Tylenol without success.  She had a bowel movement and then after started vomiting about 3 times.  This progressed to vomiting of bright red blood a few times. She denies any prior Hx of similar symptoms, PUD.  She informed me that she is aware of having gallstones but never had an attack in the past.  She is scheduled to have surgery at [facility] in May with Dr. 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  She had a stress echo in my office on 17 showing normal LV systolic function and LVEF=60-65% with mild MVP and mild MR and TR but no evidence for ischemia.    17:  Feeling much better.  No N/V and no abd. pains.  Feeling hungry.  No cardiac symptoms.  HIDA scan (-) for acute cholecystitis.    PAST MEDICAL HISTORY:  Hyperlipidemia  Hypothyroidism  HTN  Right Kidney mass - awaiting to have surgery with Dr. Sosa at [facility] in May  Knee and Hip Arthritis-s/p injection Rx-DR Cohen  PRP- Dr Sean HODGSON (+)    Colon Lexqbj-ptcdludbbsc-Kr Andrea Sachnoff16    PAST SURGICAL HISTORY:  s/p resection of mass in left breast-2017-Dr. Jaquez - path was ? pre-cancerous  s/p right knee arthroscopic surgery for meniscus tear-Dr Thakkar-14  s/p thyroidectomy for goiter-    FAMILY HISTORY:  Mother - HTN, obesity  Father - obesity (2017 09:15)        ALLERGIES:  Allergies  No Known Allergies      REVIEW OF SYSTEMS:    CONSTITUTIONAL: No weakness, fevers or chills  EYES/ENT: No visual changes;  No vertigo or throat pain   NECK: No pain or stiffness  RESPIRATORY: No cough, wheezing, hemoptysis; No shortness of breath  CARDIOVASCULAR: No chest pain or palpitations  GENITOURINARY: No dysuria, frequency or hematuria  NEUROLOGICAL: No numbness or weakness  SKIN: No itching, burning, rashes, or lesions   All other review of systems is negative unless indicated above    Vital Signs Last 24 Hrs  T(C): 36.7, Max: 36.9 ( @ 02:17)  T(F): 98, Max: 98.4 ( @ 02:17)  HR: 85 (71 - 85)  BP: 125/73 (125/73 - 181/87)  BP(mean): --  RR: 17 (16 - 18)  SpO2: 95% (95% - 98%)    I&O's Summary    I & Os for current day (as of 2017 18:22)  =============================================  IN: 270 ml / OUT: 0 ml / NET: 270 ml      PHYSICAL EXAM:   Constitutional: NAD, awake and alert, well-developed  HEENT: PERR, EOMI, Normal Hearing, MMM  Neck: Soft and supple, No LAD, No JVD  Respiratory: Breath sounds are clear bilaterally, No wheezing, rales or rhonchi  Cardiovascular: S1 and S2, regular rate and rhythm, soft VIVIANA at base; No gallops or rubs  Gastrointestinal: Bowel Sounds present, soft, nontender, nondistended, no guarding, no rebound  Extremities: No peripheral edema  Vascular: 2+ peripheral pulses  Neurological: A/O x 3, no focal deficits  Musculoskeletal: 5/5 strength b/l upper and lower extremities  Skin: No rashes      MEDICATIONS  (STANDING):  piperacillin/tazobactam IVPB. 3.375Gram(s) IV Intermittent every 8 hours  dextrose 5% + sodium chloride 0.9% with potassium chloride 20 mEq/L 1000milliLiter(s) IV Continuous <Continuous>  pantoprazole  Injectable 40milliGRAM(s) IV Push every 12 hours  amLODIPine   Tablet 2.5milliGRAM(s) Oral daily  levothyroxine 125MICROGram(s) Oral daily      LABS: All Labs Reviewed:                        12.3   6.5   )-----------( 255      ( 2017 12:24 )             38.0         144  |  109<H>  |  9   ----------------------------<  84  3.9   |  28  |  0.82    Ca    8.9      2017 12:24  Phos  3.2       Mg     2.3         TPro  7.4  /  Alb  3.7  /  TBili  1.2  /  DBili  0.2  /  AST  481<H>  /  ALT  549<H>  /  AlkPhos  158<H>      PT/INR - ( 2017 22:23 )   PT: 11.0 sec;   INR: 1.02 ratio         PTT - ( 2017 22:23 )  PTT:33.3 sec  CARDIAC MARKERS ( 2017 07:26 )  <0.015 ng/mL / x     / x     / x     / x      CARDIAC MARKERS ( 2017 01:57 )  <0.015 ng/mL / x     / x     / x     / x      CARDIAC MARKERS ( 2017 22:23 )  <0.015 ng/mL / x     / 174 U/L / x     / x          RADIOLOGY: Abd U/S: 17:  Cholelithiasis with equivocal findings of cholecystitis. Consider HIDA   scan for further evaluation.  Indeterminate 4.5 cm hyperechogenic upper right renal lesion. Further   evaluation with CT or MRI is recommended.  Complex 4 cm left hepatic cystic lesion with septations. Hepatomegaly.    HIDA Scan: 17:  Normal hepatobiliary scan.  No scan evidence of acute cholecystitis.    EK17  Normal sinus rhythm  Minimal voltage criteria for LVH, may be normal variant  Borderline ECG  When compared with ECG of 2017 07:42,  No significant change was found    TELEMETRY:      ECHO:  Stress echo in my office on 17 showing normal LV systolic function and LVEF=60-65% with mild MVP and mild MR and TR but no evidence for ischemia.

## 2017-04-14 NOTE — DISCHARGE NOTE ADULT - MEDICATION SUMMARY - MEDICATIONS TO TAKE
I will START or STAY ON the medications listed below when I get home from the hospital:    amLODIPine 2.5 mg oral tablet  -- 1 tab(s) by mouth once a day  -- Indication: For blood pressure    levothyroxine 125 mcg (0.125 mg) oral tablet  -- 1 tab(s) by mouth once a day  -- Indication: For thyroid medicine

## 2017-04-14 NOTE — PROGRESS NOTE ADULT - SUBJECTIVE AND OBJECTIVE BOX
CC:Patient is a 57y old  Female who presents with a chief complaint of I had abdominal pain and threw up a bunch of times, 4-5 times with blood. (14 Apr 2017 11:44)      Subjective:  Pt seen and examined at bedside with chaperone. Pt is AAOx3, pt in no acute distress. Pt denied c/o fever, chills, chest pain, SOB, abd pain, N/V/D, extremity pain or dysfunction, hemoptysis, hematemesis, hematuria, hematochexia, headache, diplopia, vertigo, dizzyness. Pt tolerating diet, (+) void, (+) ambulation, (+) bowel function    ROS:  negative ROS    Vital Signs Last 24 Hrs  T(C): 36.9, Max: 36.9 (04-14 @ 11:01)  T(F): 98.4, Max: 98.4 (04-14 @ 11:01)  HR: 76 (68 - 85)  BP: 133/80 (108/59 - 149/76)  BP(mean): --  RR: 18 (17 - 18)  SpO2: 97% (97% - 97%)    Labs:    CARDIAC MARKERS ( 13 Apr 2017 07:26 )  <0.015 ng/mL / x     / x     / x     / x      CARDIAC MARKERS ( 13 Apr 2017 01:57 )  <0.015 ng/mL / x     / x     / x     / x      CARDIAC MARKERS ( 12 Apr 2017 22:23 )  <0.015 ng/mL / x     / 174 U/L / x     / x                           12.4   5.3   )-----------( 238      ( 14 Apr 2017 07:35 )             38.0     CBC Full  -  ( 14 Apr 2017 07:35 )  WBC Count : 5.3 K/uL  Hemoglobin : 12.4 g/dL  Hematocrit : 38.0 %  Platelet Count - Automated : 238 K/uL  Mean Cell Volume : 89.3 fl  Mean Cell Hemoglobin : 29.1 pg  Mean Cell Hemoglobin Concentration : 32.6 gm/dL  Auto Neutrophil # : x  Auto Lymphocyte # : x  Auto Monocyte # : x  Auto Eosinophil # : x  Auto Basophil # : x  Auto Neutrophil % : x  Auto Lymphocyte % : x  Auto Monocyte % : x  Auto Eosinophil % : x  Auto Basophil % : x    04-14    143  |  111<H>  |  7   ----------------------------<  107<H>  4.0   |  27  |  0.83    Ca    8.9      14 Apr 2017 07:35  Phos  3.2     04-13  Mg     2.3     04-13    TPro  7.3  /  Alb  3.6  /  TBili  1.1  /  DBili  x   /  AST  168<H>  /  ALT  371<H>  /  AlkPhos  142<H>  04-14    LIVER FUNCTIONS - ( 14 Apr 2017 07:35 )  Alb: 3.6 g/dL / Pro: 7.3 gm/dL / ALK PHOS: 142 U/L / ALT: 371 U/L / AST: 168 U/L / GGT: x           PT/INR - ( 12 Apr 2017 22:23 )   PT: 11.0 sec;   INR: 1.02 ratio         PTT - ( 12 Apr 2017 22:23 )  PTT:33.3 sec      Meds:  ondansetron Injectable 4milliGRAM(s) IV Push every 6 hours PRN  acetaminophen   Tablet 650milliGRAM(s) Oral every 6 hours PRN  piperacillin/tazobactam IVPB. 3.375Gram(s) IV Intermittent every 8 hours  pantoprazole  Injectable 40milliGRAM(s) IV Push every 12 hours  amLODIPine   Tablet 2.5milliGRAM(s) Oral daily  levothyroxine 125MICROGram(s) Oral daily      Radiology:  EXAM:  MRI ABDOMEN WO CONTRAST                            PROCEDURE DATE:  04/13/2017        INTERPRETATION:  CLINICAL INFORMATION: Cholecystitis, now Elevated LFTs,   rule out CBD stone    COMPARISON: None.    PROCEDURE:   Multiplanar noncontrast MRI/MRCP was performed without intravenous   contrast. Radial and 3D MRCP sequences were obtained.    FINDINGS:    LIVER: 4 cm cyst with few thin septations in the left lobe. Subcentimeter   cyst in the right lobe.  BILE DUCTS: Normal caliber without evidence of choledocholithiasis or   obstructing mass.  GALLBLADDER: Multiple subcentimeter stones. The wall is mildly thickened,   no pericholecystic inflammatory changes or fluid.  SPLEEN: Within normal limits.  PANCREAS: Within normal limits.  ADRENALS: Within normal limits.  KIDNEYS/URETERS: 3.7 x 3.5 cm intrarenal lesion in the interpolar right   kidney extending into the renal sinus. It demonstrates hemorrhagic foci,   heterogeneously low T2 signal, diffusion restriction and no obvious   macroscopic fat.    VISUALIZED PORTIONS:    PERITONEUM: No ascites.  RETROPERITONEUM: No lymphadenopathy.      IMPRESSION: Cholelithiasis and mild gallbladder wall thickening. No   evidence of choledocholithiasis or significant biliary ductal dilatation.    3.7 cm worrisome solid right renal mass suspicious for renal cell   neoplasm.      NEIDA VENEGASFLORENTINOK   This document has been electronically signed. Apr 13 2017  6:57PM    EXAM:  NM HEPATOBILIARY IMG                            PROCEDURE DATE:  04/13/2017        INTERPRETATION:  RADIOPHARMACEUTICAL: 3.3 mCi Tc-99m-Mebrofenin, I.V.    CLINICAL INFORMATION: 57 year-old female with right upper quadrant   abdominal pain and cholelithiasis; referred to evaluate for acute   cholecystitis    TECHNIQUE: Dynamic imaging of the anterior abdomen was performed for   approximately 1 hour following injection of radiotracer. Static images of   the abdomen in the anterior, right lateral and right anterior oblique   views were obtained immediately thereafter.    COMPARISON: No previous hepatobiliary scan for comparison    FINDINGS: There is prompt, homogeneous uptake of radiotracer by the   hepatocytes. Activity is first seen in the gallbladder at 20 minutes and   in the bowel at 35 minutes. There is good clearance of activity from the   liver by the end of the study.    IMPRESSION: Normal hepatobiliary scan.    No scan evidence of acute cholecystitis.            KAMINI NOLVIA   This document has been electronically signed. Apr 13 2017 10:22AM      Physical exam:  Pt is aaox3  Pt in no acute distress  Resp: CTAB  CVS: S1S2(+)  ABD: bowel sounds (+), soft, non distended, no rebound, no guarding, no rigidity, no skin changes to exam. No tenderness to exam, negative Batres's sign to exam  EXT: no calf tenderness or edema to exam b/l, on VTE prophylaxis  Skin: no skin changes to exam, no jaundice or icteric sclera b/l

## 2017-04-14 NOTE — PROGRESS NOTE ADULT - SUBJECTIVE AND OBJECTIVE BOX
HPI:  57 F with Hx of Hypothyroidism and HTN was admitted with nausea, vomiting, abdominal pain. Had some self limited episodes of hematemesis after several episodes of vomiting. Has known gallstones with suggestion of acute leobardo on sono, but HIDA negative. Elevated LFTs, which are improving. MRCP negative for choledocholithiasis or biliary obstruction. Hb stable. Planned kidney surgery for tumor in May.    No further abdominal pain, n/v. No fever. Wants to at and go home    PAST MEDICAL HISTORY:  Hypothyroidism  HTN  Right Kidney mass - awaiting to have surgery with Dr. Sosa at [facility] in May  Knee and Hip Arthritis  Colon Polyps    PAST SURGICAL HISTORY:  s/p resection of mass in left breast in 2017 by Dr. Jaquez - path was benign    FAMILY HISTORY:  Mother - HTN, obesity  Father - obesity (13 Apr 2017 09:15)      MEDICATIONS  (STANDING):  piperacillin/tazobactam IVPB. 3.375Gram(s) IV Intermittent every 8 hours  pantoprazole  Injectable 40milliGRAM(s) IV Push every 12 hours  amLODIPine   Tablet 2.5milliGRAM(s) Oral daily  levothyroxine 125MICROGram(s) Oral daily    MEDICATIONS  (PRN):  ondansetron Injectable 4milliGRAM(s) IV Push every 6 hours PRN Nausea and/or Vomiting  acetaminophen   Tablet 650milliGRAM(s) Oral every 6 hours PRN For Temp greater than 38 C (100.4 F)      Allergies    No Known Allergies    Intolerances        REVIEW OF SYSTEMS    General: no feer    HEENT: no icterus    Respiratory and Thorax: no SOB  	  Cardiovascular: no CP    Gastrointestinal: as above    Skin: no jaundice      Vital Signs Last 24 Hrs  T(C): 36.4, Max: 36.8 (04-13 @ 12:00)  T(F): 97.6, Max: 98.3 (04-13 @ 12:00)  HR: 68 (68 - 85)  BP: 108/59 (108/59 - 181/87)  BP(mean): --  RR: 18 (17 - 18)  SpO2: 97% (95% - 97%)    PHYSICAL EXAM:    Constitutional: NAD    HEENT: anicteric    Respiratory: CTA BL    Cardiovascular:  RRR    Gastrointestinal: soft ND +BS NTTP    Extremities: no LE edema    Neuro: no focal deficits    Skin: no jaundice      LABS:                        12.4   5.3   )-----------( 238      ( 14 Apr 2017 07:35 )             38.0     04-14    143  |  111<H>  |  7   ----------------------------<  107<H>  4.0   |  27  |  0.83    Ca    8.9      14 Apr 2017 07:35  Phos  3.2     04-13  Mg     2.3     04-13    TPro  7.3  /  Alb  3.6  /  TBili  1.1  /  DBili  x   /  AST  168<H>  /  ALT  371<H>  /  AlkPhos  142<H>  04-14    PT/INR - ( 12 Apr 2017 22:23 )   PT: 11.0 sec;   INR: 1.02 ratio         PTT - ( 12 Apr 2017 22:23 )  PTT:33.3 sec  LIVER FUNCTIONS - ( 14 Apr 2017 07:35 )  Alb: 3.6 g/dL / Pro: 7.3 gm/dL / ALK PHOS: 142 U/L / ALT: 371 U/L / AST: 168 U/L / GGT: x             RADIOLOGY & ADDITIONAL STUDIES:    EXAM:  MRI ABDOMEN WO CONTRAST                            PROCEDURE DATE:  04/13/2017        INTERPRETATION:  CLINICAL INFORMATION: Cholecystitis, now Elevated LFTs,   rule out CBD stone    COMPARISON: None.    PROCEDURE:   Multiplanar noncontrast MRI/MRCP was performed without intravenous   contrast. Radial and 3D MRCP sequences were obtained.    FINDINGS:    LIVER: 4 cm cyst with few thin septations in the left lobe. Subcentimeter   cyst in the right lobe.  BILE DUCTS: Normal caliber without evidence of choledocholithiasis or   obstructing mass.  GALLBLADDER: Multiple subcentimeter stones. The wall is mildly thickened,   no pericholecystic inflammatory changes or fluid.  SPLEEN: Within normal limits.  PANCREAS: Within normal limits.  ADRENALS: Within normal limits.  KIDNEYS/URETERS: 3.7 x 3.5 cm intrarenal lesion in the interpolar right   kidney extending into the renal sinus. It demonstrates hemorrhagic foci,   heterogeneously low T2 signal, diffusion restriction and no obvious   macroscopic fat.    VISUALIZED PORTIONS:    PERITONEUM: No ascites.  RETROPERITONEUM: No lymphadenopathy.      IMPRESSION: Cholelithiasis and mild gallbladder wall thickening. No   evidence of choledocholithiasis or significant biliary ductal dilatation.    3.7 cm worrisome solid right renal mass suspicious for renal cell   neoplasm.

## 2017-04-14 NOTE — DISCHARGE NOTE ADULT - CARE PLAN
Principal Discharge DX:	Acute cholecystitis  Goal:	to be free of pain  Instructions for follow-up, activity and diet:	Avoid fatty and fried foods.

## 2017-04-14 NOTE — PROGRESS NOTE ADULT - ASSESSMENT
58 yo woman admitted with abdominal pain, n/v. Elevated LFTs, but no choledocholithiasis.    -Presumed biliary colic, now improved.   -DC IVF  -Low fat diet  -Hb stable, no s/s GI bleeding  -Daily PPI  -Needs outpt f/u with me to monitor LFts [phone]  -Eventual cholecystectomy

## 2017-04-14 NOTE — PROGRESS NOTE ADULT - ASSESSMENT
4/13/17:  Pt with above history and GI process.  Abd US suspicious of acute cholecystitis but HIDA (-).  Hematemesis likely due to excessive vomiting.  NO cardiac issues at this time and stable from a cardiac standpoint for treatment as per medicine, GI and surgery if needed.  Will follow as work-up and treatment progresses.    4/14/17:  No new cardiac issues.  Pt eager to go home as she will be graduating and has to finish some courses ASAP.  Stable from a cardiac standpoint.  Continue as per medicine, GI and surgery etal and home when ok with above.  Will follow as an outpt prn.

## 2017-04-14 NOTE — DISCHARGE NOTE ADULT - ADDITIONAL INSTRUCTIONS
PCP- 1 week. You will need your liver functions checked at this visit and to discuss the next steps for elective cholecystectomy.   You may follow up with Dr Pimentel for this as well.  Keep your Surgical appointments for your kidney.

## 2017-04-14 NOTE — DISCHARGE NOTE ADULT - HOSPITAL COURSE
57 F with Hx of Hypothyroidism and HTN came to the ED last evening with sudden onset of nausea, vomiting, abdominal pain. She was in her usual state of health and last night she ate some Mac and Cheese with Lopez [employer]its around 5pm.  Shortly after, she developed diaphoresis, sweats, chills and sharp epigastric pain which she never experienced in the past.  She took Peptobismal and Tylenol without success.  She had a bowel movement and then after started vomiting about 3 times.  This progressed to vomiting of bright red blood a few times. She denies any prior Hx of similar symptoms, PUD.  She informed me that she is aware of having gallstones but never had an attack in the past.  She is scheduled to have surgery at [facility] in May with Dr. 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    Pt was admitted for conservative treatment. She was NPO. Cardiology, GI, Surgery were all consulted. Plan for further outpatient evaluation. Pt improved and tolerated diet prior to discharge. Medically stable for discharge. Pt would like to finish course work for graduation at home. She will keep outpatient follow up.       Assessment and Plan:     ACUTE ABDOMINAL PAIN, NAUSEA, VOMITING DUE TO CHOLELITHIASIS AND ACUTE CHOLECYSTITIS    ELEVATED LIVER TRANSAMINASES DUE TO ACUTE CHOLECYSTITIS    S/P EPISODES OF HEMATEMESIS / UPPER GI BLEED FOLLOWING VOMITING, R/O AYAZ-MARSH TEAR. CURRENTLY HEMODYNAMICALLY STABLE AND H/H STABLE.  NO PRIOR HX OF UGIB OR PUD.    HYPERTENSION    HYPOTHYROIDISM    RIGHT KIDNEY MASS, AWAITING ELECTIVE RESECTION IN MAY AT [facility]    LEFT HEPATIC CYSTIC LESION WITH SEPTATIONS    Dr. Gaona notified on admission.    total time 45 minutes.      PHYSICAL EXAM:    Constitutional: NAD, awake and alert, well-developed  Respiratory: Breath sounds are clear bilaterally, No wheezing, rales or rhonchi  Cardiovascular: S1 and S2, regular rate and rhythm, no Murmurs, gallops or rubs  Gastrointestinal: Bowel Sounds present, soft, nontender, nondistended, no guarding, no rebound  Neurological: A/O x 3, no focal deficits  Musculoskeletal: 5/5 strength b/l upper and lower extremities  Skin: No rashes

## 2017-04-18 DIAGNOSIS — E03.9 HYPOTHYROIDISM, UNSPECIFIED: ICD-10-CM

## 2017-04-18 DIAGNOSIS — K76.89 OTHER SPECIFIED DISEASES OF LIVER: ICD-10-CM

## 2017-04-18 DIAGNOSIS — N28.89 OTHER SPECIFIED DISORDERS OF KIDNEY AND URETER: ICD-10-CM

## 2017-04-18 DIAGNOSIS — I10 ESSENTIAL (PRIMARY) HYPERTENSION: ICD-10-CM

## 2017-04-18 DIAGNOSIS — K92.2 GASTROINTESTINAL HEMORRHAGE, UNSPECIFIED: ICD-10-CM

## 2017-04-18 DIAGNOSIS — K80.00 CALCULUS OF GALLBLADDER WITH ACUTE CHOLECYSTITIS WITHOUT OBSTRUCTION: ICD-10-CM

## 2017-05-02 ENCOUNTER — APPOINTMENT (OUTPATIENT)
Dept: SURGICAL ONCOLOGY | Facility: CLINIC | Age: 57
End: 2017-05-02

## 2017-05-02 VITALS
HEART RATE: 87 BPM | SYSTOLIC BLOOD PRESSURE: 156 MMHG | HEIGHT: 67 IN | DIASTOLIC BLOOD PRESSURE: 87 MMHG | WEIGHT: 230 LBS | BODY MASS INDEX: 36.1 KG/M2 | RESPIRATION RATE: 17 BRPM

## 2017-05-02 DIAGNOSIS — Z86.39 PERSONAL HISTORY OF OTHER ENDOCRINE, NUTRITIONAL AND METABOLIC DISEASE: ICD-10-CM

## 2017-05-03 PROBLEM — Z86.39 HISTORY OF GOITER: Status: RESOLVED | Noted: 2017-05-02 | Resolved: 2017-05-03

## 2017-05-09 ENCOUNTER — OUTPATIENT (OUTPATIENT)
Dept: OUTPATIENT SERVICES | Facility: HOSPITAL | Age: 57
LOS: 1 days | End: 2017-05-09
Payer: COMMERCIAL

## 2017-05-09 VITALS
HEART RATE: 68 BPM | RESPIRATION RATE: 16 BRPM | SYSTOLIC BLOOD PRESSURE: 156 MMHG | DIASTOLIC BLOOD PRESSURE: 98 MMHG | WEIGHT: 238.98 LBS | TEMPERATURE: 98 F | HEIGHT: 67 IN

## 2017-05-09 DIAGNOSIS — E89.0 POSTPROCEDURAL HYPOTHYROIDISM: Chronic | ICD-10-CM

## 2017-05-09 DIAGNOSIS — Z98.890 OTHER SPECIFIED POSTPROCEDURAL STATES: Chronic | ICD-10-CM

## 2017-05-09 DIAGNOSIS — C64.9 MALIGNANT NEOPLASM OF UNSPECIFIED KIDNEY, EXCEPT RENAL PELVIS: ICD-10-CM

## 2017-05-09 DIAGNOSIS — R03.0 ELEVATED BLOOD-PRESSURE READING, WITHOUT DIAGNOSIS OF HYPERTENSION: ICD-10-CM

## 2017-05-09 DIAGNOSIS — N28.89 OTHER SPECIFIED DISORDERS OF KIDNEY AND URETER: ICD-10-CM

## 2017-05-09 LAB
ALBUMIN SERPL ELPH-MCNC: 4.1 G/DL — SIGNIFICANT CHANGE UP (ref 3.3–5)
ALP SERPL-CCNC: 106 U/L — SIGNIFICANT CHANGE UP (ref 40–120)
ALT FLD-CCNC: 12 U/L — SIGNIFICANT CHANGE UP (ref 4–33)
APPEARANCE UR: CLEAR — SIGNIFICANT CHANGE UP
AST SERPL-CCNC: 15 U/L — SIGNIFICANT CHANGE UP (ref 4–32)
BILIRUB SERPL-MCNC: 0.3 MG/DL — SIGNIFICANT CHANGE UP (ref 0.2–1.2)
BILIRUB UR-MCNC: NEGATIVE — SIGNIFICANT CHANGE UP
BLD GP AB SCN SERPL QL: NEGATIVE — SIGNIFICANT CHANGE UP
BLOOD UR QL VISUAL: HIGH
BUN SERPL-MCNC: 11 MG/DL — SIGNIFICANT CHANGE UP (ref 7–23)
CALCIUM SERPL-MCNC: 9.4 MG/DL — SIGNIFICANT CHANGE UP (ref 8.4–10.5)
CHLORIDE SERPL-SCNC: 105 MMOL/L — SIGNIFICANT CHANGE UP (ref 98–107)
CO2 SERPL-SCNC: 25 MMOL/L — SIGNIFICANT CHANGE UP (ref 22–31)
COLOR SPEC: YELLOW — SIGNIFICANT CHANGE UP
CREAT SERPL-MCNC: 0.72 MG/DL — SIGNIFICANT CHANGE UP (ref 0.5–1.3)
GLUCOSE SERPL-MCNC: 67 MG/DL — LOW (ref 70–99)
GLUCOSE UR-MCNC: NEGATIVE — SIGNIFICANT CHANGE UP
HCT VFR BLD CALC: 36.8 % — SIGNIFICANT CHANGE UP (ref 34.5–45)
HGB BLD-MCNC: 11.7 G/DL — SIGNIFICANT CHANGE UP (ref 11.5–15.5)
KETONES UR-MCNC: NEGATIVE — SIGNIFICANT CHANGE UP
LEUKOCYTE ESTERASE UR-ACNC: NEGATIVE — SIGNIFICANT CHANGE UP
MCHC RBC-ENTMCNC: 28.3 PG — SIGNIFICANT CHANGE UP (ref 27–34)
MCHC RBC-ENTMCNC: 31.8 % — LOW (ref 32–36)
MCV RBC AUTO: 89.1 FL — SIGNIFICANT CHANGE UP (ref 80–100)
MUCOUS THREADS # UR AUTO: SIGNIFICANT CHANGE UP
NITRITE UR-MCNC: NEGATIVE — SIGNIFICANT CHANGE UP
PH UR: 6 — SIGNIFICANT CHANGE UP (ref 4.6–8)
PLATELET # BLD AUTO: 280 K/UL — SIGNIFICANT CHANGE UP (ref 150–400)
PMV BLD: 10.7 FL — SIGNIFICANT CHANGE UP (ref 7–13)
POTASSIUM SERPL-MCNC: 4.1 MMOL/L — SIGNIFICANT CHANGE UP (ref 3.5–5.3)
POTASSIUM SERPL-SCNC: 4.1 MMOL/L — SIGNIFICANT CHANGE UP (ref 3.5–5.3)
PROT SERPL-MCNC: 7.4 G/DL — SIGNIFICANT CHANGE UP (ref 6–8.3)
PROT UR-MCNC: 20 — SIGNIFICANT CHANGE UP
RBC # BLD: 4.13 M/UL — SIGNIFICANT CHANGE UP (ref 3.8–5.2)
RBC # FLD: 14.5 % — SIGNIFICANT CHANGE UP (ref 10.3–14.5)
RBC CASTS # UR COMP ASSIST: SIGNIFICANT CHANGE UP (ref 0–?)
RH IG SCN BLD-IMP: POSITIVE — SIGNIFICANT CHANGE UP
SODIUM SERPL-SCNC: 145 MMOL/L — SIGNIFICANT CHANGE UP (ref 135–145)
SP GR SPEC: 1.03 — SIGNIFICANT CHANGE UP (ref 1–1.03)
SQUAMOUS # UR AUTO: SIGNIFICANT CHANGE UP
UROBILINOGEN FLD QL: 1 E.U. — SIGNIFICANT CHANGE UP (ref 0.1–0.2)
WBC # BLD: 5.96 K/UL — SIGNIFICANT CHANGE UP (ref 3.8–10.5)
WBC # FLD AUTO: 5.96 K/UL — SIGNIFICANT CHANGE UP (ref 3.8–10.5)
WBC UR QL: SIGNIFICANT CHANGE UP (ref 0–?)

## 2017-05-09 PROCEDURE — 93010 ELECTROCARDIOGRAM REPORT: CPT

## 2017-05-09 RX ORDER — SODIUM CHLORIDE 9 MG/ML
1000 INJECTION, SOLUTION INTRAVENOUS
Qty: 0 | Refills: 0 | Status: DISCONTINUED | OUTPATIENT
Start: 2017-05-22 | End: 2017-05-23

## 2017-05-09 NOTE — H&P PST ADULT - PRO PAIN LIFE ADAPT
inability to concentrate/inability to sleep/decreased appetite/inability to work/decreased activity level

## 2017-05-09 NOTE — H&P PST ADULT - FAMILY HISTORY
Mother  Still living? No  Hypertension, Age at diagnosis: Age Unknown  Type 2 diabetes mellitus, Age at diagnosis: Age Unknown

## 2017-05-09 NOTE — H&P PST ADULT - NSANTHOSAYNRD_GEN_A_CORE
No. ADRIENNE screening performed.  STOP BANG Legend: 0-2 = LOW Risk; 3-4 = INTERMEDIATE Risk; 5-8 = HIGH Risk

## 2017-05-09 NOTE — H&P PST ADULT - PSH
H/O arthroscopic knee surgery  right meniscus-2016  H/O thyroidectomy  1983  S/P breast lumpectomy  left-1/2017

## 2017-05-09 NOTE — H&P PST ADULT - PMH
Arthritis  b/l knees  Breast nodule  left breast  H/O goiter    Hyperlipidemia    Hypertension    Hypothyroid Arthritis  b/l knees  Breast nodule  left breast  H/O goiter    Hyperlipidemia    Hypertension    Hypothyroid    Obese

## 2017-05-09 NOTE — H&P PST ADULT - PROBLEM SELECTOR PLAN 1
Pt. is scheduled for a right robotic assisted partial nephrectomy, possible open possible radical, laparoscopic cholecystectomy 5/22/17.

## 2017-05-11 LAB
BACTERIA UR CULT: SIGNIFICANT CHANGE UP
SPECIMEN SOURCE: SIGNIFICANT CHANGE UP

## 2017-05-19 NOTE — ASU PATIENT PROFILE, ADULT - PMH
Arthritis  b/l knees  Breast nodule  left breast  H/O goiter    Hyperlipidemia    Hypertension    Hypothyroid    Obese

## 2017-05-22 ENCOUNTER — RESULT REVIEW (OUTPATIENT)
Age: 57
End: 2017-05-22

## 2017-05-22 ENCOUNTER — APPOINTMENT (OUTPATIENT)
Dept: UROLOGY | Facility: HOSPITAL | Age: 57
End: 2017-05-22

## 2017-05-22 ENCOUNTER — INPATIENT (INPATIENT)
Facility: HOSPITAL | Age: 57
LOS: 2 days | Discharge: HOME | End: 2017-05-25
Attending: UROLOGY | Admitting: SURGERY
Payer: COMMERCIAL

## 2017-05-22 ENCOUNTER — APPOINTMENT (OUTPATIENT)
Dept: SURGICAL ONCOLOGY | Facility: HOSPITAL | Age: 57
End: 2017-05-22

## 2017-05-22 VITALS
OXYGEN SATURATION: 98 % | TEMPERATURE: 98 F | HEART RATE: 73 BPM | SYSTOLIC BLOOD PRESSURE: 149 MMHG | WEIGHT: 238.98 LBS | HEIGHT: 67 IN | RESPIRATION RATE: 16 BRPM | DIASTOLIC BLOOD PRESSURE: 87 MMHG

## 2017-05-22 DIAGNOSIS — E89.0 POSTPROCEDURAL HYPOTHYROIDISM: Chronic | ICD-10-CM

## 2017-05-22 DIAGNOSIS — Z98.890 OTHER SPECIFIED POSTPROCEDURAL STATES: Chronic | ICD-10-CM

## 2017-05-22 DIAGNOSIS — C64.9 MALIGNANT NEOPLASM OF UNSPECIFIED KIDNEY, EXCEPT RENAL PELVIS: ICD-10-CM

## 2017-05-22 LAB — RH IG SCN BLD-IMP: POSITIVE — SIGNIFICANT CHANGE UP

## 2017-05-22 PROCEDURE — 76998 US GUIDE INTRAOP: CPT | Mod: 26

## 2017-05-22 PROCEDURE — 88304 TISSUE EXAM BY PATHOLOGIST: CPT | Mod: 26

## 2017-05-22 PROCEDURE — 88307 TISSUE EXAM BY PATHOLOGIST: CPT | Mod: 26

## 2017-05-22 PROCEDURE — 50543 LAPARO PARTIAL NEPHRECTOMY: CPT | Mod: RT

## 2017-05-22 PROCEDURE — 88331 PATH CONSLTJ SURG 1 BLK 1SPC: CPT | Mod: 26

## 2017-05-22 PROCEDURE — 88305 TISSUE EXAM BY PATHOLOGIST: CPT | Mod: 26

## 2017-05-22 RX ORDER — HEPARIN SODIUM 5000 [USP'U]/ML
5000 INJECTION INTRAVENOUS; SUBCUTANEOUS EVERY 8 HOURS
Qty: 0 | Refills: 0 | Status: DISCONTINUED | OUTPATIENT
Start: 2017-05-22 | End: 2017-05-25

## 2017-05-22 RX ORDER — ACETAMINOPHEN 500 MG
650 TABLET ORAL EVERY 6 HOURS
Qty: 0 | Refills: 0 | Status: DISCONTINUED | OUTPATIENT
Start: 2017-05-22 | End: 2017-05-23

## 2017-05-22 RX ORDER — FENTANYL CITRATE 50 UG/ML
50 INJECTION INTRAVENOUS
Qty: 0 | Refills: 0 | Status: DISCONTINUED | OUTPATIENT
Start: 2017-05-22 | End: 2017-05-22

## 2017-05-22 RX ORDER — ONDANSETRON 8 MG/1
4 TABLET, FILM COATED ORAL ONCE
Qty: 0 | Refills: 0 | Status: COMPLETED | OUTPATIENT
Start: 2017-05-22 | End: 2017-05-22

## 2017-05-22 RX ORDER — HYDROMORPHONE HYDROCHLORIDE 2 MG/ML
0.5 INJECTION INTRAMUSCULAR; INTRAVENOUS; SUBCUTANEOUS
Qty: 0 | Refills: 0 | Status: DISCONTINUED | OUTPATIENT
Start: 2017-05-22 | End: 2017-05-23

## 2017-05-22 RX ORDER — DICLOFENAC SODIUM 75 MG/1
1 TABLET, DELAYED RELEASE ORAL
Qty: 0 | Refills: 0 | COMMUNITY

## 2017-05-22 RX ORDER — SENNA PLUS 8.6 MG/1
2 TABLET ORAL AT BEDTIME
Qty: 0 | Refills: 0 | Status: DISCONTINUED | OUTPATIENT
Start: 2017-05-22 | End: 2017-05-25

## 2017-05-22 RX ORDER — LEVOTHYROXINE SODIUM 125 MCG
1 TABLET ORAL
Qty: 0 | Refills: 0 | COMMUNITY

## 2017-05-22 RX ORDER — HYDROMORPHONE HYDROCHLORIDE 2 MG/ML
0.5 INJECTION INTRAMUSCULAR; INTRAVENOUS; SUBCUTANEOUS
Qty: 0 | Refills: 0 | Status: DISCONTINUED | OUTPATIENT
Start: 2017-05-22 | End: 2017-05-22

## 2017-05-22 RX ORDER — CEFAZOLIN SODIUM 1 G
2000 VIAL (EA) INJECTION EVERY 8 HOURS
Qty: 0 | Refills: 0 | Status: COMPLETED | OUTPATIENT
Start: 2017-05-22 | End: 2017-05-23

## 2017-05-22 RX ORDER — DOCUSATE SODIUM 100 MG
100 CAPSULE ORAL THREE TIMES A DAY
Qty: 0 | Refills: 0 | Status: DISCONTINUED | OUTPATIENT
Start: 2017-05-22 | End: 2017-05-25

## 2017-05-22 RX ORDER — AMLODIPINE BESYLATE 2.5 MG/1
1 TABLET ORAL
Qty: 0 | Refills: 0 | COMMUNITY

## 2017-05-22 RX ORDER — LEVOTHYROXINE SODIUM 125 MCG
125 TABLET ORAL DAILY
Qty: 0 | Refills: 0 | Status: DISCONTINUED | OUTPATIENT
Start: 2017-05-22 | End: 2017-05-25

## 2017-05-22 RX ORDER — AMLODIPINE BESYLATE 2.5 MG/1
2.5 TABLET ORAL DAILY
Qty: 0 | Refills: 0 | Status: DISCONTINUED | OUTPATIENT
Start: 2017-05-22 | End: 2017-05-25

## 2017-05-22 RX ADMIN — Medication 100 MILLIGRAM(S): at 16:15

## 2017-05-22 RX ADMIN — SODIUM CHLORIDE 30 MILLILITER(S): 9 INJECTION, SOLUTION INTRAVENOUS at 06:25

## 2017-05-22 RX ADMIN — Medication 100 MILLIGRAM(S): at 21:49

## 2017-05-22 RX ADMIN — HEPARIN SODIUM 5000 UNIT(S): 5000 INJECTION INTRAVENOUS; SUBCUTANEOUS at 16:15

## 2017-05-22 RX ADMIN — AMLODIPINE BESYLATE 2.5 MILLIGRAM(S): 2.5 TABLET ORAL at 17:42

## 2017-05-22 RX ADMIN — HEPARIN SODIUM 5000 UNIT(S): 5000 INJECTION INTRAVENOUS; SUBCUTANEOUS at 21:49

## 2017-05-22 RX ADMIN — SENNA PLUS 2 TABLET(S): 8.6 TABLET ORAL at 21:49

## 2017-05-22 RX ADMIN — ONDANSETRON 4 MILLIGRAM(S): 8 TABLET, FILM COATED ORAL at 16:59

## 2017-05-22 NOTE — PROGRESS NOTE ADULT - SUBJECTIVE AND OBJECTIVE BOX
STATUS POST:      POST OPERATIVE DAY #:     Vital Signs Last 24 Hrs  T(C): 36.2, Max: 36.4 (05-22 @ 06:25)  T(F): 97.2, Max: 97.6 (05-22 @ 06:25)  HR: 69 (60 - 74)  BP: 147/80 (122/67 - 155/87)  BP(mean): --  RR: 15 (11 - 16)  SpO2: 96% (95% - 99%)      SUBJECTIVE: Pt seen + examined  SOB:  [] YES [X] NO  Dyspnea: []YES [X]NO  Chest Discomfort: [] YES [X] NO    Nausea: [] YES [X] NO           Vomiting: [] YES X[] NO  Flatus: [] YES [X] NO             Bowel Movement: [] YES [X] NO  Diarrhea: [] YES [X] NO         Constipation: [] YES [X] NO        Velarde: IN PLACE    PHYSICAL EXAM:  Constitutional: Patient well nourish. well developed.   Eyes:  EOMI, Conjunctiva clear.  ENMT:  WNL  Neck:  Supple.  Respiratory:  Lungs CTA, B/L, no rales , no wheezing, no rhonchi.  Cardiovascular:  S1, S2, RRR  Gastrointestinal: Abdomen soft, non distended, + BS, non tenderness  Wound: C/D/I  Genitourinary:  Normal.  Extremities:  No edema, no calf tenderrness,  Neurological: AxAxOx3      I&O's Summary    I & Os for current day (as of 22 May 2017 16:35)  =============================================  IN: 220 ml / OUT: 280 ml / NET: -60 ml    I&O's Detail    I & Os for current day (as of 22 May 2017 16:35)  =============================================  IN:    Oral Fluid: 120 ml    lactated ringers.: 100 ml    Total IN: 220 ml  ---------------------------------------------  OUT:    Indwelling Catheter - Urethral: 270 ml    Bulb: 10 ml    Total OUT: 280 ml  ---------------------------------------------  Total NET: -60 ml      MEDICATIONS (STANDING): lactated ringers. 1000milliLiter(s) IV Continuous <Continuous>  amLODIPine   Tablet 2.5milliGRAM(s) Oral daily  levothyroxine 125MICROGram(s) Oral daily  senna 2Tablet(s) Oral at bedtime  docusate sodium 100milliGRAM(s) Oral three times a day  heparin  Injectable 5000Unit(s) SubCutaneous every 8 hours  ceFAZolin   IVPB 2000milliGRAM(s) IV Intermittent every 8 hours    MEDICATIONS (PRN):fentaNYL    Injectable 50MICROGram(s) IV Push every 5 minutes PRN Severe Pain  HYDROmorphone  Injectable 0.5milliGRAM(s) IV Push every 10 minutes PRN Moderate Pain (4 - 6)  ondansetron Injectable 4milliGRAM(s) IV Push once PRN Nausea and/or Vomiting  acetaminophen   Tablet. 650milliGRAM(s) Oral every 6 hours PRN Mild Pain (1 - 3)  oxyCODONE  5 mG/acetaminophen 325 mG 1Tablet(s) Oral every 4 hours PRN Moderate Pain (4 - 6)  oxyCODONE  5 mG/acetaminophen 325 mG 2Tablet(s) Oral every 6 hours PRN Severe Pain (7 - 10)  HYDROmorphone  Injectable 0.5milliGRAM(s) IV Push every 3 hours PRN breakthrough pain    MEDICATIONS  (STANDING):  lactated ringers. 1000milliLiter(s) IV Continuous <Continuous>  amLODIPine   Tablet 2.5milliGRAM(s) Oral daily  levothyroxine 125MICROGram(s) Oral daily  senna 2Tablet(s) Oral at bedtime  docusate sodium 100milliGRAM(s) Oral three times a day  heparin  Injectable 5000Unit(s) SubCutaneous every 8 hours  ceFAZolin   IVPB 2000milliGRAM(s) IV Intermittent every 8 hours    MEDICATIONS  (PRN):  fentaNYL    Injectable 50MICROGram(s) IV Push every 5 minutes PRN Severe Pain  HYDROmorphone  Injectable 0.5milliGRAM(s) IV Push every 10 minutes PRN Moderate Pain (4 - 6)  ondansetron Injectable 4milliGRAM(s) IV Push once PRN Nausea and/or Vomiting  acetaminophen   Tablet. 650milliGRAM(s) Oral every 6 hours PRN Mild Pain (1 - 3)  oxyCODONE  5 mG/acetaminophen 325 mG 1Tablet(s) Oral every 4 hours PRN Moderate Pain (4 - 6)  oxyCODONE  5 mG/acetaminophen 325 mG 2Tablet(s) Oral every 6 hours PRN Severe Pain (7 - 10)  HYDROmorphone  Injectable 0.5milliGRAM(s) IV Push every 3 hours PRN breakthrough pain    Patient is a 57y Female s/p robotic assisted cholecystectomy for symptomatic cholelithiasis, R partial nephrectomy by urology team  -Advance diet as tolerated  -OOB  -velarde per urology  -percocet prn pain  -DVT ppx

## 2017-05-22 NOTE — BRIEF OPERATIVE NOTE - PRE-OP DX
Cholelithiasis  05/22/2017    Active  Parth Olson  Renal mass, right  05/22/2017    Active  Parth Olson
Cholelithiasis  05/22/2017    Active  Parth Olson  Renal mass, right  05/22/2017    Active  Parth Olson

## 2017-05-22 NOTE — BRIEF OPERATIVE NOTE - PROCEDURE
Cholecystectomy  05/22/2017  Robotic assisted lap  Active  [email or web address]  Partial nephrectomy  05/22/2017  Robotic assisted lap  Active  [email or web address]
Cholecystectomy  05/22/2017  Robotic assisted lap  Active  Parth Olson

## 2017-05-22 NOTE — BRIEF OPERATIVE NOTE - OPERATION/FINDINGS
robotic assisted cholecystectomy for symptomatic cholelithiasis  [R partial nephrectomy by urology team]

## 2017-05-22 NOTE — PROGRESS NOTE ADULT - SUBJECTIVE AND OBJECTIVE BOX
Post op check  This 56 yo F is s/p R. edmond partial neph  PMH: HTN; hypothyroid; chol    Pt awake and alert  No c/o  Afeb 142/81  85  98%RA  Abd- soft; appropriately tender          steri's C&D          JAYDA bld tinged 10cc  Murry clear 270

## 2017-05-23 ENCOUNTER — TRANSCRIPTION ENCOUNTER (OUTPATIENT)
Age: 57
End: 2017-05-23

## 2017-05-23 DIAGNOSIS — I10 ESSENTIAL (PRIMARY) HYPERTENSION: ICD-10-CM

## 2017-05-23 DIAGNOSIS — E03.9 HYPOTHYROIDISM, UNSPECIFIED: ICD-10-CM

## 2017-05-23 DIAGNOSIS — Z29.9 ENCOUNTER FOR PROPHYLACTIC MEASURES, UNSPECIFIED: ICD-10-CM

## 2017-05-23 LAB
BUN SERPL-MCNC: 14 MG/DL — SIGNIFICANT CHANGE UP (ref 7–23)
CALCIUM SERPL-MCNC: 8.8 MG/DL — SIGNIFICANT CHANGE UP (ref 8.4–10.5)
CHLORIDE SERPL-SCNC: 103 MMOL/L — SIGNIFICANT CHANGE UP (ref 98–107)
CO2 SERPL-SCNC: 29 MMOL/L — SIGNIFICANT CHANGE UP (ref 22–31)
CREAT SERPL-MCNC: 1.26 MG/DL — SIGNIFICANT CHANGE UP (ref 0.5–1.3)
GLUCOSE SERPL-MCNC: 98 MG/DL — SIGNIFICANT CHANGE UP (ref 70–99)
HCT VFR BLD CALC: 35.8 % — SIGNIFICANT CHANGE UP (ref 34.5–45)
HGB BLD-MCNC: 11.3 G/DL — LOW (ref 11.5–15.5)
MCHC RBC-ENTMCNC: 27.7 PG — SIGNIFICANT CHANGE UP (ref 27–34)
MCHC RBC-ENTMCNC: 31.6 % — LOW (ref 32–36)
MCV RBC AUTO: 87.7 FL — SIGNIFICANT CHANGE UP (ref 80–100)
PLATELET # BLD AUTO: 228 K/UL — SIGNIFICANT CHANGE UP (ref 150–400)
PMV BLD: 9.7 FL — SIGNIFICANT CHANGE UP (ref 7–13)
POTASSIUM SERPL-MCNC: 3.6 MMOL/L — SIGNIFICANT CHANGE UP (ref 3.5–5.3)
POTASSIUM SERPL-SCNC: 3.6 MMOL/L — SIGNIFICANT CHANGE UP (ref 3.5–5.3)
RBC # BLD: 4.08 M/UL — SIGNIFICANT CHANGE UP (ref 3.8–5.2)
RBC # FLD: 14.3 % — SIGNIFICANT CHANGE UP (ref 10.3–14.5)
SODIUM SERPL-SCNC: 142 MMOL/L — SIGNIFICANT CHANGE UP (ref 135–145)
WBC # BLD: 8.27 K/UL — SIGNIFICANT CHANGE UP (ref 3.8–10.5)
WBC # FLD AUTO: 8.27 K/UL — SIGNIFICANT CHANGE UP (ref 3.8–10.5)

## 2017-05-23 PROCEDURE — 99223 1ST HOSP IP/OBS HIGH 75: CPT

## 2017-05-23 RX ORDER — HYDROMORPHONE HYDROCHLORIDE 2 MG/ML
0.5 INJECTION INTRAMUSCULAR; INTRAVENOUS; SUBCUTANEOUS EVERY 4 HOURS
Qty: 0 | Refills: 0 | Status: DISCONTINUED | OUTPATIENT
Start: 2017-05-23 | End: 2017-05-25

## 2017-05-23 RX ORDER — POTASSIUM CHLORIDE 20 MEQ
20 PACKET (EA) ORAL ONCE
Qty: 0 | Refills: 0 | Status: COMPLETED | OUTPATIENT
Start: 2017-05-23 | End: 2017-05-23

## 2017-05-23 RX ORDER — OXYCODONE HYDROCHLORIDE 5 MG/1
10 TABLET ORAL EVERY 6 HOURS
Qty: 0 | Refills: 0 | Status: DISCONTINUED | OUTPATIENT
Start: 2017-05-23 | End: 2017-05-24

## 2017-05-23 RX ORDER — OXYCODONE HYDROCHLORIDE 5 MG/1
5 TABLET ORAL EVERY 4 HOURS
Qty: 0 | Refills: 0 | Status: DISCONTINUED | OUTPATIENT
Start: 2017-05-23 | End: 2017-05-25

## 2017-05-23 RX ORDER — ACETAMINOPHEN 500 MG
650 TABLET ORAL EVERY 6 HOURS
Qty: 0 | Refills: 0 | Status: DISCONTINUED | OUTPATIENT
Start: 2017-05-23 | End: 2017-05-25

## 2017-05-23 RX ORDER — SODIUM CHLORIDE 9 MG/ML
1000 INJECTION, SOLUTION INTRAVENOUS
Qty: 0 | Refills: 0 | Status: DISCONTINUED | OUTPATIENT
Start: 2017-05-23 | End: 2017-05-25

## 2017-05-23 RX ADMIN — HEPARIN SODIUM 5000 UNIT(S): 5000 INJECTION INTRAVENOUS; SUBCUTANEOUS at 21:33

## 2017-05-23 RX ADMIN — Medication 10 MILLIGRAM(S): at 06:06

## 2017-05-23 RX ADMIN — SODIUM CHLORIDE 75 MILLILITER(S): 9 INJECTION, SOLUTION INTRAVENOUS at 21:33

## 2017-05-23 RX ADMIN — HEPARIN SODIUM 5000 UNIT(S): 5000 INJECTION INTRAVENOUS; SUBCUTANEOUS at 06:06

## 2017-05-23 RX ADMIN — Medication 100 MILLIGRAM(S): at 21:33

## 2017-05-23 RX ADMIN — AMLODIPINE BESYLATE 2.5 MILLIGRAM(S): 2.5 TABLET ORAL at 06:06

## 2017-05-23 RX ADMIN — Medication 650 MILLIGRAM(S): at 18:36

## 2017-05-23 RX ADMIN — Medication 125 MICROGRAM(S): at 06:06

## 2017-05-23 RX ADMIN — SODIUM CHLORIDE 75 MILLILITER(S): 9 INJECTION, SOLUTION INTRAVENOUS at 09:48

## 2017-05-23 RX ADMIN — Medication 100 MILLIGRAM(S): at 06:06

## 2017-05-23 RX ADMIN — OXYCODONE HYDROCHLORIDE 10 MILLIGRAM(S): 5 TABLET ORAL at 22:07

## 2017-05-23 RX ADMIN — Medication 100 MILLIGRAM(S): at 00:56

## 2017-05-23 RX ADMIN — Medication 100 MILLIGRAM(S): at 14:57

## 2017-05-23 RX ADMIN — Medication 20 MILLIEQUIVALENT(S): at 09:48

## 2017-05-23 RX ADMIN — HEPARIN SODIUM 5000 UNIT(S): 5000 INJECTION INTRAVENOUS; SUBCUTANEOUS at 14:57

## 2017-05-23 RX ADMIN — SENNA PLUS 2 TABLET(S): 8.6 TABLET ORAL at 21:33

## 2017-05-23 RX ADMIN — Medication 10 MILLIGRAM(S): at 18:36

## 2017-05-23 RX ADMIN — OXYCODONE HYDROCHLORIDE 10 MILLIGRAM(S): 5 TABLET ORAL at 21:37

## 2017-05-23 NOTE — CONSULT NOTE ADULT - PROBLEM SELECTOR RECOMMENDATION 9
S/P Lap. partial right nephrectomy and cholecystectomy, POD #1. Doing well.  Further management as per .  Pain control  Monitor for BM  OOB and ambulation  Incentive spirometer

## 2017-05-23 NOTE — CONSULT NOTE ADULT - SUBJECTIVE AND OBJECTIVE BOX
Patient is a 57y old  Female who presents with a chief complaint of "MRI showed I have a mass inside the right kidney and also saw stones in the gallbladder" (09 May 2017 13:41)      HPI:  Pt. is a 56 yo female that had an incidental finding of a right kidney mass and cholelithiasis. (09 May 2017 13:41). S/P partial right nephrectomy and cholecystectomy yesterday. POD#1, feels better today. No SOB, no chest pain. Not passing gas yet.          Pain Symptoms if applicable:             	                         none	   Pain:	                            0	   Location:	  Modifying factors:	  Associated symptoms:	    Function: [x ] Independent  [ ] Assistance  [ ] Total care  [ ] Non-ambulatory    Allergies    No Known Allergies    Intolerances        HOME MEDICATIONS: [x ] Reviewed    MEDICATIONS  (STANDING):  amLODIPine   Tablet 2.5milliGRAM(s) Oral daily  levothyroxine 125MICROGram(s) Oral daily  senna 2Tablet(s) Oral at bedtime  docusate sodium 100milliGRAM(s) Oral three times a day  heparin  Injectable 5000Unit(s) SubCutaneous every 8 hours  dextrose 5% + sodium chloride 0.45%. 1000milliLiter(s) IV Continuous <Continuous>    MEDICATIONS  (PRN):  acetaminophen   Tablet. 650milliGRAM(s) Oral every 6 hours PRN Mild Pain (1 - 3)  oxyCODONE  5 mG/acetaminophen 325 mG 1Tablet(s) Oral every 4 hours PRN Moderate Pain (4 - 6)  oxyCODONE  5 mG/acetaminophen 325 mG 2Tablet(s) Oral every 6 hours PRN Severe Pain (7 - 10)  HYDROmorphone  Injectable 0.5milliGRAM(s) IV Push every 3 hours PRN breakthrough pain      PAST MEDICAL & SURGICAL HISTORY:  Cholelithiasis  Kidney mass: right  Obese  H/O goiter  Breast nodule: left breast  Arthritis: b/l knees  Hypothyroid  Hypertension  Hyperlipidemia  S/P breast lumpectomy: left-1/2017  H/O thyroidectomy: 1983  H/O arthroscopic knee surgery: right meniscus-2016  [x ] Reviewed     SOCIAL HISTORY:  Residence: [ ] [facility]  [ ] [facility]  [x ] Community  [ ] Substance abuse: denies  [ ] Tobacco: denies  [ ] Alcohol use: Social drink, CAGE (-)    FAMILY HISTORY:  Type 2 diabetes mellitus (Mother)  Hypertension (Mother)  [ ] No pertinent family history in first degree relatives     REVIEW OF SYSTEMS:    CONSTITUTIONAL: No fever, weight loss, or fatigue  EYES: No eye pain, visual disturbances, or discharge  ENMT:  No difficulty hearing, tinnitus, vertigo; No sinus or throat pain  NECK: No pain or stiffness  BREASTS: No pain, masses, or nipple discharge  RESPIRATORY: No cough, wheezing, chills or hemoptysis; No shortness of breath  CARDIOVASCULAR: No chest pain, palpitations, dizziness, or leg swelling  GASTROINTESTINAL: No abdominal or epigastric pain. No nausea, vomiting, or hematemesis; No BM not passing gas  GENITOURINARY: No dysuria, frequency, hematuria, or incontinence. (+) Murry  NEUROLOGICAL: No headaches, memory loss, loss of strength, numbness, or tremors  SKIN: No itching, burning, rashes, or lesions   LYMPH NODES: No enlarged glands  ENDOCRINE: No heat or cold intolerance; No hair loss  MUSCULOSKELETAL: No muscle or back pain  PSYCHIATRIC: No depression, anxiety, mood swings, or difficulty sleeping  HEME/LYMPH: No easy bruising, or bleeding gums  ALLERGY AND IMMUNOLOGIC: No hives or eczema    [ x ] All other ROS negative  [  ] Unable to obtain due to poor mental status    Vital Signs Last 24 Hrs  T(C): 37.6, Max: 37.6 (05-23 @ 02:03)  T(F): 99.7, Max: 99.7 (05-23 @ 02:03)  HR: 98 (60 - 98)  BP: 130/69 (122/67 - 155/87)  BP(mean): --  RR: 16 (11 - 16)  SpO2: 94% (94% - 99%)    PHYSICAL EXAM:    GENERAL: NAD, well-groomed, well-developed  HEAD:  Atraumatic, Normocephalic  EYES: EOMI, PERRLA, conjunctiva and sclera clear  ENMT: Moist mucous membranes  NECK: Supple, No JVD  RESPIRATORY: Clear to auscultation bilaterally; No rales, rhonchi, wheezing, or rubs  CARDIOVASCULAR: Regular rate and rhythm; No murmurs, rubs, or gallops  GASTROINTESTINAL: Soft, (+) mild tenderness at right UQ, Nondistended; Bowel sounds hypoactive  GENITOURINARY: Not examined, (+) Murry with clear urine  EXTREMITIES:  2+ Peripheral Pulses, No clubbing, cyanosis, or edema  NERVOUS SYSTEM:  Alert & Oriented X3; Moving all 4 extremities; No gross sensory deficits  HEME/LYMPH: No lymphadenopathy noted  SKIN: No rashes or lesions; Incisions C/D/I    LABS:                        11.3   8.27  )-----------( 228      ( 23 May 2017 05:55 )             35.8     05-23    142  |  103  |  14  ----------------------------<  98  3.6   |  29  |  1.26    Ca    8.8      23 May 2017 05:55          CAPILLARY BLOOD GLUCOSE      RADIOLOGY & ADDITIONAL STUDIES:    EKG:   Personally Reviewed:  [x ] YES Normal sinus rhythm  Minimal voltage criteria for LVH, may be normal variant  Borderline ECG  When compared with ECG of 25-JAN-2017 07:42,  No significant change was found  Confirmed by Shlomo Richard MD ([id number]) on 4/13/2017 12:10:46 PM    Imaging:   Personally Reviewed:  [ ] YES CXR: Impression:    No acute disease              Consultant(s) notes reviewed:    Care Discussed with Consultant(s)/Other Providers:    Advanced Directives: [ ] DNR  [ ] No feeding tube  [ ] MOLST in chart  [ ] MOLST completed today  [x ] Unknown

## 2017-05-23 NOTE — PROGRESS NOTE ADULT - SUBJECTIVE AND OBJECTIVE BOX
Patient looks and feels well  AVSS  abdomen soft NT ND  dressing c/d/i  Urine clear and yellow    Ambulate  VT tomorrow in AM on rounds  Daily labs  Clears.

## 2017-05-23 NOTE — PROGRESS NOTE ADULT - SUBJECTIVE AND OBJECTIVE BOX
ANESTHESIA POSTOP CHECK    57y Female POSTOP DAY 1 S/P     Vital Signs Last 24 Hrs  T(C): 36.9, Max: 37.6 (05-23 @ 02:03)  T(F): 98.5, Max: 99.7 (05-23 @ 02:03)  HR: 85 (60 - 98)  BP: 124/58 (122/67 - 155/87)  BP(mean): --  RR: 16 (11 - 16)  SpO2: 95% (94% - 99%)  I&O's Summary  I & Os for 24h ending 23 May 2017 07:00  =============================================  IN: 220 ml / OUT: 2845 ml / NET: -2625 ml    I & Os for current day (as of 23 May 2017 09:56)  =============================================  IN: 1200 ml / OUT: 0 ml / NET: 1200 ml      [x ] NO APPARENT ANESTHESIA COMPLICATIONS      Comments:

## 2017-05-23 NOTE — PROGRESS NOTE ADULT - SUBJECTIVE AND OBJECTIVE BOX
POD #01 s/p robotic cholecystectomy/right partial nephrectomy.    Complains of post-operative nausea/emesis.  Improved this am.  Pain better controlled.    Afrebrile, VSS.    Abdomen: soft, nontender/nondistended.    Assessment: Stable.  Plan: May advance diet as tolerated.  Discharge when tolerating po and cleared from Urology issues.

## 2017-05-23 NOTE — PROGRESS NOTE ADULT - SUBJECTIVE AND OBJECTIVE BOX
POD #1  Afeb 136/70  93  95%RA  Pt has no c/o  Abd- soft NT ND no flatus      wounds C&D;   Jeanmarie 1L  JAYDA 40

## 2017-05-23 NOTE — CONSULT NOTE ADULT - ASSESSMENT
57 y.o. F with h/o HTN, hypothyroidism, gallstones and right renal mass, admitted for partial nephrectomy and cholecystectomy. S/P OR yesterday, POD #1, doing well.

## 2017-05-24 DIAGNOSIS — K80.20 CALCULUS OF GALLBLADDER WITHOUT CHOLECYSTITIS WITHOUT OBSTRUCTION: ICD-10-CM

## 2017-05-24 DIAGNOSIS — R50.9 FEVER, UNSPECIFIED: ICD-10-CM

## 2017-05-24 LAB
BUN SERPL-MCNC: 11 MG/DL — SIGNIFICANT CHANGE UP (ref 7–23)
CALCIUM SERPL-MCNC: 8.9 MG/DL — SIGNIFICANT CHANGE UP (ref 8.4–10.5)
CHLORIDE SERPL-SCNC: 102 MMOL/L — SIGNIFICANT CHANGE UP (ref 98–107)
CO2 SERPL-SCNC: 24 MMOL/L — SIGNIFICANT CHANGE UP (ref 22–31)
CREAT FLD-MCNC: 1.59 MG/DL — SIGNIFICANT CHANGE UP
CREAT SERPL-MCNC: 1.23 MG/DL — SIGNIFICANT CHANGE UP (ref 0.5–1.3)
GLUCOSE SERPL-MCNC: 111 MG/DL — HIGH (ref 70–99)
HCT VFR BLD CALC: 35.1 % — SIGNIFICANT CHANGE UP (ref 34.5–45)
HGB BLD-MCNC: 11.2 G/DL — LOW (ref 11.5–15.5)
MCHC RBC-ENTMCNC: 28.3 PG — SIGNIFICANT CHANGE UP (ref 27–34)
MCHC RBC-ENTMCNC: 31.9 % — LOW (ref 32–36)
MCV RBC AUTO: 88.6 FL — SIGNIFICANT CHANGE UP (ref 80–100)
PLATELET # BLD AUTO: 223 K/UL — SIGNIFICANT CHANGE UP (ref 150–400)
PMV BLD: 9.9 FL — SIGNIFICANT CHANGE UP (ref 7–13)
POTASSIUM SERPL-MCNC: 3.6 MMOL/L — SIGNIFICANT CHANGE UP (ref 3.5–5.3)
POTASSIUM SERPL-SCNC: 3.6 MMOL/L — SIGNIFICANT CHANGE UP (ref 3.5–5.3)
RBC # BLD: 3.96 M/UL — SIGNIFICANT CHANGE UP (ref 3.8–5.2)
RBC # FLD: 14.1 % — SIGNIFICANT CHANGE UP (ref 10.3–14.5)
SODIUM SERPL-SCNC: 140 MMOL/L — SIGNIFICANT CHANGE UP (ref 135–145)
WBC # BLD: 10.15 K/UL — SIGNIFICANT CHANGE UP (ref 3.8–10.5)
WBC # FLD AUTO: 10.15 K/UL — SIGNIFICANT CHANGE UP (ref 3.8–10.5)

## 2017-05-24 PROCEDURE — 99233 SBSQ HOSP IP/OBS HIGH 50: CPT

## 2017-05-24 PROCEDURE — 71010: CPT | Mod: 26

## 2017-05-24 RX ORDER — CEFAZOLIN SODIUM 1 G
2000 VIAL (EA) INJECTION EVERY 8 HOURS
Qty: 0 | Refills: 0 | Status: DISCONTINUED | OUTPATIENT
Start: 2017-05-24 | End: 2017-05-24

## 2017-05-24 RX ORDER — CIPROFLOXACIN LACTATE 400MG/40ML
400 VIAL (ML) INTRAVENOUS EVERY 12 HOURS
Qty: 0 | Refills: 0 | Status: DISCONTINUED | OUTPATIENT
Start: 2017-05-24 | End: 2017-05-25

## 2017-05-24 RX ORDER — OXYCODONE HYDROCHLORIDE 5 MG/1
10 TABLET ORAL EVERY 4 HOURS
Qty: 0 | Refills: 0 | Status: DISCONTINUED | OUTPATIENT
Start: 2017-05-24 | End: 2017-05-25

## 2017-05-24 RX ORDER — CIPROFLOXACIN LACTATE 400MG/40ML
400 VIAL (ML) INTRAVENOUS ONCE
Qty: 0 | Refills: 0 | Status: COMPLETED | OUTPATIENT
Start: 2017-05-24 | End: 2017-05-24

## 2017-05-24 RX ORDER — METRONIDAZOLE 500 MG
500 TABLET ORAL EVERY 8 HOURS
Qty: 0 | Refills: 0 | Status: DISCONTINUED | OUTPATIENT
Start: 2017-05-24 | End: 2017-05-25

## 2017-05-24 RX ORDER — CIPROFLOXACIN LACTATE 400MG/40ML
VIAL (ML) INTRAVENOUS
Qty: 0 | Refills: 0 | Status: DISCONTINUED | OUTPATIENT
Start: 2017-05-24 | End: 2017-05-25

## 2017-05-24 RX ADMIN — OXYCODONE HYDROCHLORIDE 10 MILLIGRAM(S): 5 TABLET ORAL at 10:40

## 2017-05-24 RX ADMIN — Medication 100 MILLIGRAM(S): at 07:35

## 2017-05-24 RX ADMIN — OXYCODONE HYDROCHLORIDE 10 MILLIGRAM(S): 5 TABLET ORAL at 09:56

## 2017-05-24 RX ADMIN — Medication 500 MILLIGRAM(S): at 13:07

## 2017-05-24 RX ADMIN — Medication 500 MILLIGRAM(S): at 21:13

## 2017-05-24 RX ADMIN — Medication 100 MILLIGRAM(S): at 05:14

## 2017-05-24 RX ADMIN — OXYCODONE HYDROCHLORIDE 10 MILLIGRAM(S): 5 TABLET ORAL at 05:50

## 2017-05-24 RX ADMIN — Medication 650 MILLIGRAM(S): at 01:11

## 2017-05-24 RX ADMIN — OXYCODONE HYDROCHLORIDE 10 MILLIGRAM(S): 5 TABLET ORAL at 05:19

## 2017-05-24 RX ADMIN — Medication 200 MILLIGRAM(S): at 21:13

## 2017-05-24 RX ADMIN — SENNA PLUS 2 TABLET(S): 8.6 TABLET ORAL at 21:13

## 2017-05-24 RX ADMIN — AMLODIPINE BESYLATE 2.5 MILLIGRAM(S): 2.5 TABLET ORAL at 05:13

## 2017-05-24 RX ADMIN — OXYCODONE HYDROCHLORIDE 10 MILLIGRAM(S): 5 TABLET ORAL at 21:13

## 2017-05-24 RX ADMIN — HEPARIN SODIUM 5000 UNIT(S): 5000 INJECTION INTRAVENOUS; SUBCUTANEOUS at 21:13

## 2017-05-24 RX ADMIN — Medication 125 MICROGRAM(S): at 05:14

## 2017-05-24 RX ADMIN — Medication 200 MILLIGRAM(S): at 08:48

## 2017-05-24 RX ADMIN — Medication 100 MILLIGRAM(S): at 13:07

## 2017-05-24 RX ADMIN — Medication 100 MILLIGRAM(S): at 21:13

## 2017-05-24 RX ADMIN — OXYCODONE HYDROCHLORIDE 10 MILLIGRAM(S): 5 TABLET ORAL at 21:45

## 2017-05-24 RX ADMIN — SODIUM CHLORIDE 75 MILLILITER(S): 9 INJECTION, SOLUTION INTRAVENOUS at 21:13

## 2017-05-24 RX ADMIN — HEPARIN SODIUM 5000 UNIT(S): 5000 INJECTION INTRAVENOUS; SUBCUTANEOUS at 05:13

## 2017-05-24 RX ADMIN — HEPARIN SODIUM 5000 UNIT(S): 5000 INJECTION INTRAVENOUS; SUBCUTANEOUS at 13:07

## 2017-05-24 NOTE — PROGRESS NOTE ADULT - SUBJECTIVE AND OBJECTIVE BOX
POD #2    T max 101.5  Now 100  137/65  100  93%RA  Pt has pain with movement; no other c/o    Abd- softly distended; had small  flatus and loose BM           wounds C&D  Murry 700  JAYDA  7.5

## 2017-05-24 NOTE — PROGRESS NOTE ADULT - SUBJECTIVE AND OBJECTIVE BOX
Patient is a 57y old  Female who presents with a chief complaint of "MRI showed I have a mass inside the right kidney and also saw stones in the gallbladder" (09 May 2017 13:41)      SUBJECTIVE / OVERNIGHT EVENTS: Pt had a fever of 101 last night and 101.5 early this morning. No cough, (+) BM with liquid stools. No other complaints.    MEDICATIONS  (STANDING):  amLODIPine   Tablet 2.5milliGRAM(s) Oral daily  levothyroxine 125MICROGram(s) Oral daily  senna 2Tablet(s) Oral at bedtime  docusate sodium 100milliGRAM(s) Oral three times a day  heparin  Injectable 5000Unit(s) SubCutaneous every 8 hours  dextrose 5% + sodium chloride 0.45%. 1000milliLiter(s) IV Continuous <Continuous>  ciprofloxacin   IVPB  IV Intermittent   metroNIDAZOLE    Tablet 500milliGRAM(s) Oral every 8 hours  ciprofloxacin   IVPB 400milliGRAM(s) IV Intermittent every 12 hours    MEDICATIONS  (PRN):  acetaminophen   Tablet 650milliGRAM(s) Oral every 6 hours PRN For Temp greater than 38 C (100.4 F)  HYDROmorphone  Injectable 0.5milliGRAM(s) IV Push every 4 hours PRN Severe Pain (7 - 10)  oxyCODONE IR 10milliGRAM(s) Oral every 6 hours PRN Moderate Pain (4 - 6)  oxyCODONE IR 5milliGRAM(s) Oral every 4 hours PRN Mild Pain (1 - 3)      Vital Signs Last 24 Hrs  T(C): 37.8, Max: 38.6 (05-24 @ 01:48)  HR: 100 (85 - 105)  BP: 137/65 (124/58 - 139/75)  RR: 16 (16 - 18)  SpO2: 93% (93% - 100%)  Wt(kg): --  CAPILLARY BLOOD GLUCOSE    I&O's Summary    I & Os for current day (as of 24 May 2017 09:19)  =============================================  IN: 1200 ml / OUT: 1862.5 ml / NET: -662.5 ml      PHYSICAL EXAM:  GENERAL: NAD, well-developed  HEAD:  Atraumatic, Normocephalic  EYES: EOMI, PERRLA, conjunctiva and sclera clear  NECK: Supple, No JVD  CHEST/LUNG: (+) decreased BS b/l bases. No crackles or rhonchi; No wheeze  HEART: Regular rate and rhythm; No murmurs, rubs, or gallops  ABDOMEN: Soft, (+) mild tenderness at RUQ, Nondistended; Bowel sounds present  EXTREMITIES:  2+ Peripheral Pulses, No clubbing, cyanosis, or edema  PSYCH: AAOx3  NEUROLOGY: non-focal  SKIN: No rashes or lesions    LABS:                        11.2   10.15 )-----------( 223      ( 24 May 2017 02:26 )             35.1     05-24    140  |  102  |  11  ----------------------------<  111<H>  3.6   |  24  |  1.23    Ca    8.9      24 May 2017 02:30                RADIOLOGY & ADDITIONAL TESTS:    Imaging Personally Reviewed:Patient is a 57y old  Female who presents with a chief complaint of "MRI showed I have a mass inside the right kidney and also saw stones in the gallbladder" (09 May 2017 13:41)      SUBJECTIVE / OVERNIGHT EVENTS:    MEDICATIONS  (STANDING):  amLODIPine   Tablet 2.5milliGRAM(s) Oral daily  levothyroxine 125MICROGram(s) Oral daily  senna 2Tablet(s) Oral at bedtime  docusate sodium 100milliGRAM(s) Oral three times a day  heparin  Injectable 5000Unit(s) SubCutaneous every 8 hours  dextrose 5% + sodium chloride 0.45%. 1000milliLiter(s) IV Continuous <Continuous>  ciprofloxacin   IVPB  IV Intermittent   metroNIDAZOLE    Tablet 500milliGRAM(s) Oral every 8 hours  ciprofloxacin   IVPB 400milliGRAM(s) IV Intermittent every 12 hours    MEDICATIONS  (PRN):  acetaminophen   Tablet 650milliGRAM(s) Oral every 6 hours PRN For Temp greater than 38 C (100.4 F)  HYDROmorphone  Injectable 0.5milliGRAM(s) IV Push every 4 hours PRN Severe Pain (7 - 10)  oxyCODONE IR 10milliGRAM(s) Oral every 6 hours PRN Moderate Pain (4 - 6)  oxyCODONE IR 5milliGRAM(s) Oral every 4 hours PRN Mild Pain (1 - 3)      Vital Signs Last 24 Hrs  T(C): 37.8, Max: 38.6 (05-24 @ 01:48)  HR: 100 (85 - 105)  BP: 137/65 (124/58 - 139/75)  RR: 16 (16 - 18)  SpO2: 93% (93% - 100%)  Wt(kg): --  CAPILLARY BLOOD GLUCOSE    I&O's Summary    I & Os for current day (as of 24 May 2017 09:19)  =============================================  IN: 1200 ml / OUT: 1862.5 ml / NET: -662.5 ml      PHYSICAL EXAM:  GENERAL: NAD, well-developed  HEAD:  Atraumatic, Normocephalic  EYES: EOMI, PERRLA, conjunctiva and sclera clear  NECK: Supple, No JVD  CHEST/LUNG: Clear to auscultation bilaterally; No wheeze  HEART: Regular rate and rhythm; No murmurs, rubs, or gallops  ABDOMEN: Soft, Nontender, Nondistended; Bowel sounds present  EXTREMITIES:  2+ Peripheral Pulses, No clubbing, cyanosis, or edema  PSYCH: AAOx3  NEUROLOGY: non-focal  SKIN: No rashes or lesions    LABS:                        11.2   10.15 )-----------( 223      ( 24 May 2017 02:26 )             35.1     05-24    140  |  102  |  11  ----------------------------<  111<H>  3.6   |  24  |  1.23    Ca    8.9      24 May 2017 02:30                RADIOLOGY & ADDITIONAL TESTS:    Imaging Personally Reviewed: (+) ? atelectasis at left base    Consultant(s) Notes Reviewed:      Care Discussed with Consultants/Other Providers:       Consultant(s) Notes Reviewed:      Care Discussed with Consultants/Other Providers:

## 2017-05-24 NOTE — PROGRESS NOTE ADULT - ASSESSMENT
57 y.o. F with h/o HTN, hypothyroidism, gall stones and renal mass, s/p partial right nephrectomy and cholecystectomy. POD#2, (+) fever, etiology not clear, on Cipro/flagyl, ? post-op fever due to atelectasis. r/o C-diff.

## 2017-05-24 NOTE — PROGRESS NOTE ADULT - ATTENDING COMMENTS
POD #02 s/p robotic cholecystectomy/right partial nephrectomy.    Febrile overnight.  Poor effort with incentive spirometer.    Abdomen: soft/NT/ND.  JAYDA serous.    Assessment: Stable, febrile secondary to atelectasis.  Plan: Diet as tolerated.  OOB/IS.  D/C planning per urology.  Patient to follow up in office upon discharge.

## 2017-05-24 NOTE — PROGRESS NOTE ADULT - SUBJECTIVE AND OBJECTIVE BOX
SURGERY DAILY PROGRESS NOTE:     Postoperative Day: 2    Overnight Events:  Patient febrile to 38.6. She complains of abdominal pain. She has passed flatus but has no appetite.      Physical Exam  Vital Signs Last 24 Hrs  T(C): 37.8, Max: 38.6 (05-24 @ 01:48)  T(F): 100, Max: 101.5 (05-24 @ 02:22)  HR: 100 (85 - 105)  BP: 137/65 (124/58 - 139/75)  BP(mean): --  RR: 16 (16 - 18)  SpO2: 93% (93% - 100%)  Gen: NAD  HEENT: normocephalic, atraumatic, no scleral icterus  CV: S1, S2, RRR  Pulm: CTA B/L  Abd: Soft, ND, appropriately tender, JAYDA drains in place  Ext: warm, no edema, palp dp/pt  I&O's Detail  I & Os for 24h ending 23 May 2017 07:00  =============================================  IN:    Oral Fluid: 120 ml    lactated ringers.: 100 ml    Total IN: 220 ml  ---------------------------------------------  OUT:    Indwelling Catheter - Urethral: 2720 ml    Bulb: 125 ml    Total OUT: 2845 ml  ---------------------------------------------  Total NET: -2625 ml    I & Os for current day (as of 24 May 2017 06:34)  =============================================  IN:    lactated ringers.: 1200 ml    Total IN: 1200 ml  ---------------------------------------------  OUT:    Indwelling Catheter - Urethral: 1425 ml    Bulb: 35 ml    Total OUT: 1460 ml  ---------------------------------------------  Total NET: -260 ml      Labs:                        11.2   10.15 )-----------( 223      ( 24 May 2017 02:26 )             35.1     05-24    140  |  102  |  11  ----------------------------<  111<H>  3.6   |  24  |  1.23    Ca    8.9      24 May 2017 02:30      Assesment/Plan  57y year old Female s/p Partial nephrectomy and Cholecystectomy  , POD# 2    -Pain appropriate for surgery  -Diet advance as tolerated  -Febrile overnight will continue to monitor

## 2017-05-25 ENCOUNTER — TRANSCRIPTION ENCOUNTER (OUTPATIENT)
Age: 57
End: 2017-05-25

## 2017-05-25 VITALS
OXYGEN SATURATION: 97 % | DIASTOLIC BLOOD PRESSURE: 68 MMHG | SYSTOLIC BLOOD PRESSURE: 133 MMHG | HEART RATE: 99 BPM | RESPIRATION RATE: 17 BRPM | TEMPERATURE: 99 F

## 2017-05-25 LAB
BACTERIA UR CULT: SIGNIFICANT CHANGE UP
BASOPHILS # BLD AUTO: 0.01 K/UL — SIGNIFICANT CHANGE UP (ref 0–0.2)
BASOPHILS NFR BLD AUTO: 0.1 % — SIGNIFICANT CHANGE UP (ref 0–2)
BUN SERPL-MCNC: 11 MG/DL — SIGNIFICANT CHANGE UP (ref 7–23)
CALCIUM SERPL-MCNC: 9.2 MG/DL — SIGNIFICANT CHANGE UP (ref 8.4–10.5)
CHLORIDE SERPL-SCNC: 102 MMOL/L — SIGNIFICANT CHANGE UP (ref 98–107)
CO2 SERPL-SCNC: 20 MMOL/L — LOW (ref 22–31)
CREAT FLD-MCNC: 1.73 MG/DL — SIGNIFICANT CHANGE UP
CREAT SERPL-MCNC: 1.3 MG/DL — SIGNIFICANT CHANGE UP (ref 0.5–1.3)
EOSINOPHIL # BLD AUTO: 0.09 K/UL — SIGNIFICANT CHANGE UP (ref 0–0.5)
EOSINOPHIL NFR BLD AUTO: 0.9 % — SIGNIFICANT CHANGE UP (ref 0–6)
GLUCOSE SERPL-MCNC: 94 MG/DL — SIGNIFICANT CHANGE UP (ref 70–99)
HCT VFR BLD CALC: 33.6 % — LOW (ref 34.5–45)
HGB BLD-MCNC: 10.8 G/DL — LOW (ref 11.5–15.5)
IMM GRANULOCYTES NFR BLD AUTO: 0.3 % — SIGNIFICANT CHANGE UP (ref 0–1.5)
LYMPHOCYTES # BLD AUTO: 2.07 K/UL — SIGNIFICANT CHANGE UP (ref 1–3.3)
LYMPHOCYTES # BLD AUTO: 20.4 % — SIGNIFICANT CHANGE UP (ref 13–44)
MCHC RBC-ENTMCNC: 28.5 PG — SIGNIFICANT CHANGE UP (ref 27–34)
MCHC RBC-ENTMCNC: 32.1 % — SIGNIFICANT CHANGE UP (ref 32–36)
MCV RBC AUTO: 88.7 FL — SIGNIFICANT CHANGE UP (ref 80–100)
MONOCYTES # BLD AUTO: 1.27 K/UL — HIGH (ref 0–0.9)
MONOCYTES NFR BLD AUTO: 12.5 % — SIGNIFICANT CHANGE UP (ref 2–14)
NEUTROPHILS # BLD AUTO: 6.67 K/UL — SIGNIFICANT CHANGE UP (ref 1.8–7.4)
NEUTROPHILS NFR BLD AUTO: 65.8 % — SIGNIFICANT CHANGE UP (ref 43–77)
PLATELET # BLD AUTO: 231 K/UL — SIGNIFICANT CHANGE UP (ref 150–400)
PMV BLD: 10 FL — SIGNIFICANT CHANGE UP (ref 7–13)
POTASSIUM SERPL-MCNC: 3.7 MMOL/L — SIGNIFICANT CHANGE UP (ref 3.5–5.3)
POTASSIUM SERPL-SCNC: 3.7 MMOL/L — SIGNIFICANT CHANGE UP (ref 3.5–5.3)
RBC # BLD: 3.79 M/UL — LOW (ref 3.8–5.2)
RBC # FLD: 14.2 % — SIGNIFICANT CHANGE UP (ref 10.3–14.5)
SODIUM SERPL-SCNC: 139 MMOL/L — SIGNIFICANT CHANGE UP (ref 135–145)
SPECIMEN SOURCE: SIGNIFICANT CHANGE UP
WBC # BLD: 10.14 K/UL — SIGNIFICANT CHANGE UP (ref 3.8–10.5)
WBC # FLD AUTO: 10.14 K/UL — SIGNIFICANT CHANGE UP (ref 3.8–10.5)

## 2017-05-25 PROCEDURE — 93010 ELECTROCARDIOGRAM REPORT: CPT

## 2017-05-25 PROCEDURE — 99233 SBSQ HOSP IP/OBS HIGH 50: CPT

## 2017-05-25 RX ORDER — DOCUSATE SODIUM 100 MG
1 CAPSULE ORAL
Qty: 0 | Refills: 0 | COMMUNITY
Start: 2017-05-25

## 2017-05-25 RX ORDER — SENNA PLUS 8.6 MG/1
2 TABLET ORAL
Qty: 0 | Refills: 0 | COMMUNITY
Start: 2017-05-25

## 2017-05-25 RX ORDER — OXYCODONE HYDROCHLORIDE 5 MG/1
1 TABLET ORAL
Qty: 0 | Refills: 0 | COMMUNITY
Start: 2017-05-25

## 2017-05-25 RX ORDER — ACETAMINOPHEN 500 MG
650 TABLET ORAL EVERY 6 HOURS
Qty: 0 | Refills: 0 | Status: DISCONTINUED | OUTPATIENT
Start: 2017-05-25 | End: 2017-05-25

## 2017-05-25 RX ORDER — OXYCODONE HYDROCHLORIDE 5 MG/1
1 TABLET ORAL
Qty: 20 | Refills: 0 | OUTPATIENT
Start: 2017-05-25

## 2017-05-25 RX ORDER — METRONIDAZOLE 500 MG
1 TABLET ORAL
Qty: 15 | Refills: 0 | OUTPATIENT
Start: 2017-05-25 | End: 2017-05-30

## 2017-05-25 RX ORDER — PHENTERMINE HCL 30 MG
1 CAPSULE ORAL
Qty: 0 | Refills: 0 | COMMUNITY

## 2017-05-25 RX ORDER — MOXIFLOXACIN HYDROCHLORIDE TABLETS, 400 MG 400 MG/1
1 TABLET, FILM COATED ORAL
Qty: 10 | Refills: 0 | OUTPATIENT
Start: 2017-05-25 | End: 2017-05-30

## 2017-05-25 RX ADMIN — Medication 100 MILLIGRAM(S): at 13:24

## 2017-05-25 RX ADMIN — Medication 500 MILLIGRAM(S): at 05:15

## 2017-05-25 RX ADMIN — Medication 650 MILLIGRAM(S): at 11:45

## 2017-05-25 RX ADMIN — Medication 500 MILLIGRAM(S): at 13:26

## 2017-05-25 RX ADMIN — Medication 125 MICROGRAM(S): at 05:15

## 2017-05-25 RX ADMIN — Medication 650 MILLIGRAM(S): at 12:25

## 2017-05-25 RX ADMIN — Medication 200 MILLIGRAM(S): at 09:51

## 2017-05-25 RX ADMIN — Medication 100 MILLIGRAM(S): at 05:15

## 2017-05-25 RX ADMIN — HEPARIN SODIUM 5000 UNIT(S): 5000 INJECTION INTRAVENOUS; SUBCUTANEOUS at 05:15

## 2017-05-25 RX ADMIN — HEPARIN SODIUM 5000 UNIT(S): 5000 INJECTION INTRAVENOUS; SUBCUTANEOUS at 13:25

## 2017-05-25 RX ADMIN — AMLODIPINE BESYLATE 2.5 MILLIGRAM(S): 2.5 TABLET ORAL at 05:15

## 2017-05-25 NOTE — DISCHARGE NOTE ADULT - MEDICATION SUMMARY - MEDICATIONS TO TAKE
I will START or STAY ON the medications listed below when I get home from the hospital:    metroNIDAZOLE 500 mg oral tablet  -- 1 tab(s) by mouth every 8 hours  -- Indication: For Antibiotic    oxyCODONE 5 mg oral tablet  -- 1 to 2 tab(s) by mouth every 4 to 6 hours, As needed, For pain MDD:6  -- Indication: For Pain     diclofenac sodium 100 mg oral tablet, extended release  -- 1 tab(s) by mouth once a day, As Needed. Last dose 5/15/17  -- Indication: For Home med    amLODIPine 2.5 mg oral tablet  -- 1 tab(s) by mouth once a day in am  -- Indication: For Home med    docusate sodium 100 mg oral capsule  -- 1 cap(s) by mouth 3 times a day  -- Indication: For Constipation    senna oral tablet  -- 2 tab(s) by mouth once a day (at bedtime)  -- Indication: For Constipation    Cipro 500 mg oral tablet  -- 1 tab(s) by mouth every 12 hours  -- Avoid prolonged or excessive exposure to direct and/or artificial sunlight while taking this medication.  Check with your doctor before becoming pregnant.  Do not take dairy products, antacids, or iron preparations within one hour of this medication.  Finish all this medication unless otherwise directed by prescriber.  Medication should be taken with plenty of water.    -- Indication: For Antibiotic    levothyroxine 125 mcg (0.125 mg) oral tablet  -- 1 tab(s) by mouth once a day  -- Indication: For Home med

## 2017-05-25 NOTE — DISCHARGE NOTE ADULT - PLAN OF CARE
pain control Call the office if you have fever greater than 101, difficulty urinating, pain not relieved with pain medication, nausea/vomiting. do not drive while taking pain medication. recommend colace/senna for constipation while taking pain medication. follow up with General surgery as directed continue home medication Drink plenty of fluids.  No heavy lifting (greater than 10 pounds) or straining for 4 to 6 weeks.  You may shower, just pat white strips dry, they will fall off in 10 to 14 days.  Do not drive when taking pain medication.  Call Dr. Sosa to schedule a follow up appointment next week.  Empty JAYDA bulb as needed as instructed and keep a diary of daily outputs and bring this with you to your follow up appointment.  Call the office if you have fever greater than 101, difficulty urinating, pain not relieved with pain medication, nausea/vomiting.

## 2017-05-25 NOTE — DISCHARGE NOTE ADULT - NS AS DC FOLLOWUP STROKE INST
Smoking Cessation/Carenotes partial nephrectomy, Cholecystectomy, percocet, colace Carenotes partial nephrectomy, Cholecystectomy, percocet, colace Carenotes  Leonel partial nephrectomy,  cele Pino drain, Cholecystectomy, percocet, colace

## 2017-05-25 NOTE — DISCHARGE NOTE ADULT - CARE PROVIDERS DIRECT ADDRESSES
[email or web address]

## 2017-05-25 NOTE — DISCHARGE NOTE ADULT - ADDITIONAL INSTRUCTIONS
follow up with Dr. Sosa as directed, call for appt.  follow up with Dr. Willson from general surgery, call for appt. follow up with Dr. Sosa as directed, call for appt.  follow up with Dr. Willson from general surgery, call for appt.  Please follow up with your primary care physician regarding your hospitalization within 2 weeks after your discharge.

## 2017-05-25 NOTE — DISCHARGE NOTE ADULT - MEDICATION SUMMARY - MEDICATIONS TO STOP TAKING
I will STOP taking the medications listed below when I get home from the hospital:    phentermine 30 mg oral capsule  -- 1 cap(s) by mouth once a day (in the morning), As Needed. Last dose 5/15/17

## 2017-05-25 NOTE — PROGRESS NOTE ADULT - PROBLEM SELECTOR PLAN 1
Awaiting GI function  Pan cultured  OOB  Labs  Fleet enema  Continue clears  Observe temp; check cultures
Awaiting GI function  OOB  Labs
Clinically not septic. R/O atelectasis, r/o C-diff. r/o bacteremia.  F/U blood cx.  Continue Cipro/Flagyl  Check stool for C-diff  Incentive spirometer  Ambulation, OOB
Likely due to atelectasis. Resolved.  Will continue to monitor and f/u blood cx.  Continue Cipro/Flagyl.  Ambulation and incentive spirometer
check AM labs  check UO  awaiting GI function

## 2017-05-25 NOTE — PROGRESS NOTE ADULT - ASSESSMENT
57 y.o. F with h/o HTN, renal mass, s/p right partial nephrectomy and cholecystectomy. POD#4. Developed fever post op. All cx's negative. (+) atelectasis, ? cause of fever.

## 2017-05-25 NOTE — DISCHARGE NOTE ADULT - HOSPITAL COURSE
56 yo female admitted 5/22 s/p scheduled right robotic partial nephrectomy and cholecystectomy. febrile POD#2, started on cipro and flagyl. vealrde removed POD#3, passed TOV. Jayda Cr mildly elevated from serum Cr so home with JAYDA to bulb suction. 56 yo female admitted 5/22 s/p scheduled right robotic partial nephrectomy and cholecystectomy. febrile POD#2, started on cipro and flagyl. Ucx negative, BCx NGTD, pt remained afebrile for the remainder of her hospitalization, diet advanced without incident, velarde removed POD#3, passed TOV. JAYDA Cr mildly elevated from serum Cr so home with JAYDA to bulb suction.   I-stop checked.

## 2017-05-25 NOTE — DISCHARGE NOTE ADULT - CONDITIONS AT DISCHARGE
Abdominal incisional scope sites with steri-strips clean dry and intact. Positive bowel sounds, pt zena po diet.  Voiding without difficulty. VS stable as per flowsheet, EKG done in Am prior to DC clearance to home. Abdominal incisional scope sites with steri-strips clean dry and intact. Peter prat drainage in place. Positive bowel sounds, pt tole po diet.  Voiding without difficulty. vitals stable.

## 2017-05-25 NOTE — DISCHARGE NOTE ADULT - CARE PROVIDER_API CALL
Josh Sosa), Urology  [address] [place] [address]  Phone: [phone]  Fax: [phone]    Mahendra Willson), Surgery  [address] [place] [id number]  Phone: [phone]  Fax: [phone]

## 2017-05-25 NOTE — PROGRESS NOTE ADULT - SUBJECTIVE AND OBJECTIVE BOX
Patient is a 57y old  Female who presents with a chief complaint of "MRI showed I have a mass inside the right kidney and also saw stones in the gallbladder" (09 May 2017 13:41)      SUBJECTIVE / OVERNIGHT EVENTS: No complaint this morning, afebrile overnight. Ambulating. (+) BM    MEDICATIONS  (STANDING):  amLODIPine   Tablet 2.5milliGRAM(s) Oral daily  levothyroxine 125MICROGram(s) Oral daily  senna 2Tablet(s) Oral at bedtime  docusate sodium 100milliGRAM(s) Oral three times a day  heparin  Injectable 5000Unit(s) SubCutaneous every 8 hours  dextrose 5% + sodium chloride 0.45%. 1000milliLiter(s) IV Continuous <Continuous>  ciprofloxacin   IVPB  IV Intermittent   metroNIDAZOLE    Tablet 500milliGRAM(s) Oral every 8 hours  ciprofloxacin   IVPB 400milliGRAM(s) IV Intermittent every 12 hours    MEDICATIONS  (PRN):  acetaminophen   Tablet 650milliGRAM(s) Oral every 6 hours PRN For Temp greater than 38 C (100.4 F)  HYDROmorphone  Injectable 0.5milliGRAM(s) IV Push every 4 hours PRN Severe Pain (7 - 10)  oxyCODONE IR 5milliGRAM(s) Oral every 4 hours PRN Mild Pain (1 - 3)  oxyCODONE IR 10milliGRAM(s) Oral every 4 hours PRN Moderate Pain (4 - 6)      Vital Signs Last 24 Hrs  T(C): 37, Max: 37.7 (05-25 @ 01:27)  HR: 101 (92 - 103)  BP: 131/56 (124/56 - 138/74)  RR: 18 (16 - 18)  SpO2: 94% (94% - 96%)  Wt(kg): --  CAPILLARY BLOOD GLUCOSE    I&O's Summary  I & Os for 24h ending 25 May 2017 07:00  =============================================  IN: 0 ml / OUT: 2804.5 ml / NET: -2804.5 ml    I & Os for current day (as of 25 May 2017 09:11)  =============================================  IN: 0 ml / OUT: 400 ml / NET: -400 ml      PHYSICAL EXAM:  GENERAL: NAD, well-developed  HEAD:  Atraumatic, Normocephalic  EYES: EOMI, PERRLA, conjunctiva and sclera clear  NECK: Supple, No JVD  CHEST/LUNG: Clear to auscultation bilaterally; No wheeze  HEART: Regular rate and rhythm; No murmurs, rubs, or gallops  ABDOMEN: Soft, Nontender, Nondistended; Bowel sounds present  EXTREMITIES:  2+ Peripheral Pulses, No clubbing, cyanosis, or edema  PSYCH: AAOx3  NEUROLOGY: non-focal  SKIN: No rashes or lesions    LABS:                        10.8   10.14 )-----------( 231      ( 25 May 2017 05:05 )             33.6     05-25    139  |  102  |  11  ----------------------------<  94  3.7   |  20<L>  |  1.30    Ca    9.2      25 May 2017 05:05                RADIOLOGY & ADDITIONAL TESTS:    Imaging Personally Reviewed: CXR, EKG    Consultant(s) Notes Reviewed:      Care Discussed with Consultants/Other Providers:

## 2017-05-25 NOTE — DISCHARGE NOTE ADULT - INSTRUCTIONS
Maintain abdominal incisions clean dry and intact, steri strips will fall off on their own in 1-2 weeks. Call MD with any signs of infection ie fever/shaking chills, redness or drainage, or with signs of bleeding or persistent nausea. Continue to drink plenty of fluids and avoid straining and constipation which may be a side effect from taking narcotic pain meds. No heavy lifting greater than 10 pounds (ie a gallon of milk.) Follow-up with MD as well as PMD in office as instructed for continuity of care post-operatively, as per safe DC plan. Maintain abdominal incisions clean dry and intact, steri strips will fall off on their own in 1-2 weeks. Call MD with any fever above 100.4 chills, redness or drainage, or with signs of bleeding or persistent nausea. Continue to drink plenty of fluids and avoid straining and constipation which may be a side effect from taking narcotic pain meds. No heavy lifting greater than 10 pounds (ie a gallon of milk.) Follow-up with MD as well as PMD in office as instructed for continuity of care post-operatively, as per safe DC plan. Drink plenty of fluids Maintain abdominal incisions clean dry and intact, steri strips will fall off on their own in 1-2 weeks. Call MD with any fever above 100.4 chills, redness or drainage, or with signs of bleeding or persistent nausea. Maintain Peter Pino drain to bulb suction as instructed, empty daily and as needed and record output.  Continue to drink plenty of fluids and avoid straining and constipation which may be a side effect from taking narcotic pain meds. No heavy lifting greater than 10 pounds (ie a gallon of milk.) Follow-up with MD as well as PMD in office as instructed for continuity of care post-operatively, as per safe DC plan.

## 2017-05-25 NOTE — DISCHARGE NOTE ADULT - CARE PLAN
Principal Discharge DX:	Kidney mass  Goal:	pain control  Instructions for follow-up, activity and diet:	Call the office if you have fever greater than 101, difficulty urinating, pain not relieved with pain medication, nausea/vomiting. do not drive while taking pain medication. recommend colace/senna for constipation while taking pain medication.  Secondary Diagnosis:	Cholelithiasis  Instructions for follow-up, activity and diet:	follow up with General surgery as directed  Secondary Diagnosis:	Hypertension  Instructions for follow-up, activity and diet:	continue home medication  Secondary Diagnosis:	Hypothyroid  Instructions for follow-up, activity and diet:	continue home medication Principal Discharge DX:	Kidney mass  Goal:	pain control  Instructions for follow-up, activity and diet:	Drink plenty of fluids.  No heavy lifting (greater than 10 pounds) or straining for 4 to 6 weeks.  You may shower, just pat white strips dry, they will fall off in 10 to 14 days.  Do not drive when taking pain medication.  Call Dr. Sosa to schedule a follow up appointment next week.  Empty JAYDA bulb as needed as instructed and keep a diary of daily outputs and bring this with you to your follow up appointment.  Call the office if you have fever greater than 101, difficulty urinating, pain not relieved with pain medication, nausea/vomiting.  Secondary Diagnosis:	Cholelithiasis  Instructions for follow-up, activity and diet:	follow up with General surgery as directed  Secondary Diagnosis:	Hypertension  Instructions for follow-up, activity and diet:	continue home medication  Secondary Diagnosis:	Hypothyroid  Instructions for follow-up, activity and diet:	continue home medication

## 2017-05-25 NOTE — DISCHARGE NOTE ADULT - PATIENT PORTAL LINK FT
“You can access the Follow[employer] Patient Portal, offered by [facility], by registering with the following website: [email or web address]”

## 2017-05-25 NOTE — PROGRESS NOTE ADULT - SUBJECTIVE AND OBJECTIVE BOX
Patient seen and examined yesterday 5/24  Was without complaint  Had positive flatus  Abd was soft, inc c/d/i  Order given to advance to regular      Today 5/25   Patient without complaints  Tolerating regular diet  Abdomen soft, Inc c/d/i  drain SS  Plan:   Re-check drain creatinine  Regular diet  Ambulate

## 2017-05-26 ENCOUNTER — MESSAGE (OUTPATIENT)
Age: 57
End: 2017-05-26

## 2017-05-26 ENCOUNTER — EMERGENCY (EMERGENCY)
Facility: HOSPITAL | Age: 57
LOS: 0 days | Discharge: HOME | End: 2017-05-27
Attending: EMERGENCY MEDICINE | Admitting: EMERGENCY MEDICINE
Payer: COMMERCIAL

## 2017-05-26 VITALS — WEIGHT: 242.95 LBS | HEIGHT: 67 IN

## 2017-05-26 DIAGNOSIS — E03.9 HYPOTHYROIDISM, UNSPECIFIED: ICD-10-CM

## 2017-05-26 DIAGNOSIS — Z90.89 ACQUIRED ABSENCE OF OTHER ORGANS: ICD-10-CM

## 2017-05-26 DIAGNOSIS — E89.0 POSTPROCEDURAL HYPOTHYROIDISM: Chronic | ICD-10-CM

## 2017-05-26 DIAGNOSIS — Z86.39 PERSONAL HISTORY OF OTHER ENDOCRINE, NUTRITIONAL AND METABOLIC DISEASE: ICD-10-CM

## 2017-05-26 DIAGNOSIS — I10 ESSENTIAL (PRIMARY) HYPERTENSION: ICD-10-CM

## 2017-05-26 DIAGNOSIS — M19.90 UNSPECIFIED OSTEOARTHRITIS, UNSPECIFIED SITE: ICD-10-CM

## 2017-05-26 DIAGNOSIS — Z87.19 PERSONAL HISTORY OF OTHER DISEASES OF THE DIGESTIVE SYSTEM: ICD-10-CM

## 2017-05-26 DIAGNOSIS — R06.02 SHORTNESS OF BREATH: ICD-10-CM

## 2017-05-26 DIAGNOSIS — Z98.890 OTHER SPECIFIED POSTPROCEDURAL STATES: Chronic | ICD-10-CM

## 2017-05-26 DIAGNOSIS — N28.89 OTHER SPECIFIED DISORDERS OF KIDNEY AND URETER: ICD-10-CM

## 2017-05-26 DIAGNOSIS — E11.9 TYPE 2 DIABETES MELLITUS WITHOUT COMPLICATIONS: ICD-10-CM

## 2017-05-26 DIAGNOSIS — R94.31 ABNORMAL ELECTROCARDIOGRAM [ECG] [EKG]: ICD-10-CM

## 2017-05-26 DIAGNOSIS — R06.00 DYSPNEA, UNSPECIFIED: ICD-10-CM

## 2017-05-26 DIAGNOSIS — E78.5 HYPERLIPIDEMIA, UNSPECIFIED: ICD-10-CM

## 2017-05-26 DIAGNOSIS — E66.9 OBESITY, UNSPECIFIED: ICD-10-CM

## 2017-05-26 DIAGNOSIS — Z98.890 OTHER SPECIFIED POSTPROCEDURAL STATES: ICD-10-CM

## 2017-05-26 LAB
ALBUMIN SERPL ELPH-MCNC: 3.1 G/DL — LOW (ref 3.3–5)
ALP SERPL-CCNC: 102 U/L — SIGNIFICANT CHANGE UP (ref 40–120)
ALT FLD-CCNC: 23 U/L — SIGNIFICANT CHANGE UP (ref 12–78)
ANION GAP SERPL CALC-SCNC: 6 MMOL/L — SIGNIFICANT CHANGE UP (ref 5–17)
APTT BLD: 32.2 SEC — SIGNIFICANT CHANGE UP (ref 27.5–37.4)
AST SERPL-CCNC: 17 U/L — SIGNIFICANT CHANGE UP (ref 15–37)
BASOPHILS # BLD AUTO: 0.1 K/UL — SIGNIFICANT CHANGE UP (ref 0–0.2)
BASOPHILS NFR BLD AUTO: 1.2 % — SIGNIFICANT CHANGE UP (ref 0–2)
BILIRUB SERPL-MCNC: 0.5 MG/DL — SIGNIFICANT CHANGE UP (ref 0.2–1.2)
BUN SERPL-MCNC: 12 MG/DL — SIGNIFICANT CHANGE UP (ref 7–23)
CALCIUM SERPL-MCNC: 8.7 MG/DL — SIGNIFICANT CHANGE UP (ref 8.5–10.1)
CHLORIDE SERPL-SCNC: 110 MMOL/L — HIGH (ref 96–108)
CO2 SERPL-SCNC: 26 MMOL/L — SIGNIFICANT CHANGE UP (ref 22–31)
CREAT SERPL-MCNC: 1.1 MG/DL — SIGNIFICANT CHANGE UP (ref 0.5–1.3)
EOSINOPHIL # BLD AUTO: 0.1 K/UL — SIGNIFICANT CHANGE UP (ref 0–0.5)
EOSINOPHIL NFR BLD AUTO: 1.9 % — SIGNIFICANT CHANGE UP (ref 0–6)
GLUCOSE SERPL-MCNC: 92 MG/DL — SIGNIFICANT CHANGE UP (ref 70–99)
HCT VFR BLD CALC: 31.8 % — LOW (ref 34.5–45)
HGB BLD-MCNC: 10.2 G/DL — LOW (ref 11.5–15.5)
INR BLD: 1.1 RATIO — SIGNIFICANT CHANGE UP (ref 0.88–1.16)
LACTATE SERPL-SCNC: 0.4 MMOL/L — LOW (ref 0.7–2)
LYMPHOCYTES # BLD AUTO: 1.7 K/UL — SIGNIFICANT CHANGE UP (ref 1–3.3)
LYMPHOCYTES # BLD AUTO: 21.2 % — SIGNIFICANT CHANGE UP (ref 13–44)
MCHC RBC-ENTMCNC: 28.3 PG — SIGNIFICANT CHANGE UP (ref 27–34)
MCHC RBC-ENTMCNC: 32.1 GM/DL — SIGNIFICANT CHANGE UP (ref 32–36)
MCV RBC AUTO: 88.2 FL — SIGNIFICANT CHANGE UP (ref 80–100)
MONOCYTES # BLD AUTO: 0.7 K/UL — SIGNIFICANT CHANGE UP (ref 0–0.9)
MONOCYTES NFR BLD AUTO: 9.5 % — SIGNIFICANT CHANGE UP (ref 2–14)
NEUTROPHILS # BLD AUTO: 5.2 K/UL — SIGNIFICANT CHANGE UP (ref 1.8–7.4)
NEUTROPHILS NFR BLD AUTO: 66.2 % — SIGNIFICANT CHANGE UP (ref 43–77)
PLATELET # BLD AUTO: 247 K/UL — SIGNIFICANT CHANGE UP (ref 150–400)
POTASSIUM SERPL-MCNC: 3.6 MMOL/L — SIGNIFICANT CHANGE UP (ref 3.5–5.3)
POTASSIUM SERPL-SCNC: 3.6 MMOL/L — SIGNIFICANT CHANGE UP (ref 3.5–5.3)
PROT SERPL-MCNC: 7.1 GM/DL — SIGNIFICANT CHANGE UP (ref 6–8.3)
PROTHROM AB SERPL-ACNC: 11.9 SEC — SIGNIFICANT CHANGE UP (ref 9.8–12.7)
RBC # BLD: 3.61 M/UL — LOW (ref 3.8–5.2)
RBC # FLD: 13.4 % — SIGNIFICANT CHANGE UP (ref 10.3–14.5)
SODIUM SERPL-SCNC: 142 MMOL/L — SIGNIFICANT CHANGE UP (ref 135–145)
SURGICAL PATHOLOGY STUDY: SIGNIFICANT CHANGE UP
TROPONIN I SERPL-MCNC: <0.015 NG/ML — SIGNIFICANT CHANGE UP (ref 0.01–0.04)
WBC # BLD: 7.8 K/UL — SIGNIFICANT CHANGE UP (ref 3.8–10.5)
WBC # FLD AUTO: 7.8 K/UL — SIGNIFICANT CHANGE UP (ref 3.8–10.5)

## 2017-05-26 PROCEDURE — 71010: CPT | Mod: 26

## 2017-05-26 PROCEDURE — 93010 ELECTROCARDIOGRAM REPORT: CPT

## 2017-05-26 PROCEDURE — 99285 EMERGENCY DEPT VISIT HI MDM: CPT | Mod: 25

## 2017-05-26 PROCEDURE — 71275 CT ANGIOGRAPHY CHEST: CPT | Mod: 26

## 2017-05-26 PROCEDURE — 74177 CT ABD & PELVIS W/CONTRAST: CPT | Mod: 26

## 2017-05-26 RX ORDER — SODIUM CHLORIDE 9 MG/ML
1000 INJECTION INTRAMUSCULAR; INTRAVENOUS; SUBCUTANEOUS ONCE
Qty: 0 | Refills: 0 | Status: COMPLETED | OUTPATIENT
Start: 2017-05-26 | End: 2017-05-26

## 2017-05-26 RX ORDER — SODIUM CHLORIDE 9 MG/ML
3 INJECTION INTRAMUSCULAR; INTRAVENOUS; SUBCUTANEOUS ONCE
Qty: 0 | Refills: 0 | Status: COMPLETED | OUTPATIENT
Start: 2017-05-26 | End: 2017-05-26

## 2017-05-26 RX ADMIN — SODIUM CHLORIDE 3 MILLILITER(S): 9 INJECTION INTRAMUSCULAR; INTRAVENOUS; SUBCUTANEOUS at 18:59

## 2017-05-26 RX ADMIN — SODIUM CHLORIDE 1000 MILLILITER(S): 9 INJECTION INTRAMUSCULAR; INTRAVENOUS; SUBCUTANEOUS at 18:58

## 2017-05-26 NOTE — ED PROVIDER NOTE - MEDICAL DECISION MAKING DETAILS
57y female pt presents to the ED complaining of shortness of breath. Plan labs, CT scan, 57y female pt presents to the ED complaining of shortness of breath. Plan labs and CT scan. 57y female pt presents to the ED complaining of shortness of breath, recent surgery, fever.  Concern for PNA vs PE vs Intra-abd infection vs atelectasis. Plan labs and CT scan, reassess.

## 2017-05-26 NOTE — ED PROVIDER NOTE - PROGRESS NOTE DETAILS
Abhinav Aguilar DO (Attending): Discussed all results with patient and family.  Comfortable at this time, was sleeping without difficulty, sat normal on RA.  Call placed to Dr. Sosa (surgeon) regarding patient status. Abhinav Aguilar DO (Attending): Discussed case w/ covering physician Dr. Stephens, states patient had post op fever since POD 2, was attributed to atelectasis.  Discussed all results, agrees with plan for DC.  Discussed w/ patient and family, agree with plan.

## 2017-05-26 NOTE — ED PROVIDER NOTE - NS ED MD SCRIBE ATTENDING SCRIBE SECTIONS
PHYSICAL EXAM/PROGRESS NOTE/HISTORY OF PRESENT ILLNESS/PAST MEDICAL/SURGICAL/SOCIAL HISTORY/RESULTS/REVIEW OF SYSTEMS/DISPOSITION

## 2017-05-26 NOTE — ED STATDOCS - DETAILS:
I Milton Camacho MD saw and examined this patient upon initial presentation. Patient was sent to the main ED for further care. I agree with kacie's documentation.

## 2017-05-26 NOTE — ED PROVIDER NOTE - OBJECTIVE STATEMENT
58 y/o female pt w/ hx of arthritis, HLD and HTN and Pshx of Cholecystectomy done at [facility] four days ago and appendectomy presents to the ED c/o of SOB. Family states that she had SOB as she went up the stairs. No recent travel. Pt has no other complaints and denies n/v/d, CP and fever/chills. PMD Dr. Santos, Surgeons Dr. Sosa, Dr. Willson 56 y/o female pt w/ hx of arthritis, HLD and HTN and Pshx of Cholecystectomy done at [facility] four days ago and appendectomy presents to the ED c/o of SOB. Family states that she had SOB as she went up the stairs. No recent travel. Pt has no other complaints and denies n/v/d, CP and fever/chills. . PMD Dr. Santos, Surgeons Dr. Sosa, Dr. Willson. Primary contact: [phone], Adelso Estrada 58 y/o female pt w/ hx of arthritis, HLD and HTN and Pshx of Cholecystectomy done at [facility] four days ago and cholecystectomy presents to the ED c/o of SOB. Family states that she had SOB as she went up the stairs. No recent travel. Pt states she had a fever over the past couple of days, her surgeon was aware, and she has been on cipro/flagyl.  Denies chills/rigors.  Pt has no other complaints and denies n/v/d, CP and fever/chills. . PMD Dr. Santos, Surgeons Dr. Sosa, Dr. Willson. Primary contact: [phone], Adelso Estrada. 56 y/o female pt w/ hx of arthritis, HLD and HTN and recent surgical h/o Right Renal Mass resection and Cholecystectomy done at [facility] four days ago presents to the ED c/o of SOB. Family states that she had SOB as she went up the stairs and has had some difficulty using incentive spirometer. No recent travel. Pt states she had a fever over the past couple of days, her surgeon was aware, and she has been on cipro/flagyl.  Denies chills/rigors.  Pt is able to speak full sentences and contribue to history.  Pt has no other complaints and denies n/v/d, CP and fever/chills. . PMD Dr. Santos, Surgeons Dr. Sosa, Dr. Willson. Primary contact: [phone], Adelso Estrada, .

## 2017-05-26 NOTE — ED STATDOCS - PROGRESS NOTE DETAILS
58 y/o female with PMHx of HTN, hypothyroidism s/p cholecystectomy and right kidney mass removal on Monday 5/23 by Dr. Sosa and Dr. Willson at [facility] presents to the ED c/o SOB today.  states she had two episodes of SOB while in the hospital. Has incentive spirometer at home but is unable to take deep breaths. States 3 days after the surgery she was unable to breath and had a panic attack. Denies any pain. Currently on Cipro, Flagyl, oxycodone, amlodipine, diclofenac, synthroid. PMD/Cardio Dr. Peñaloza. Will send pt to the main ED for further evaluation.

## 2017-05-26 NOTE — ED ADULT NURSE REASSESSMENT NOTE - NS ED NURSE REASSESS COMMENT FT1
pt is resting in bed comfortably at this time. pt has no complaints at this time. Comfort and safety measures maintained. Will continue to monitor pt. pt is resting in bed comfortably at this time. pt has no complaints at this time. pt going for CT scan.  Comfort and safety measures maintained. Will continue to monitor pt.

## 2017-05-26 NOTE — ED PROVIDER NOTE - GASTROINTESTINAL, MLM
Abdomen soft, non-tender, no guarding. surgical sites to umbilicus and bilat lower abd clean dry and intact. Mild tenderness, most significant mid epigsatric, no RGR. JAYDA drain, right lower quadrant with zero sanguinous output. surgical sites to umbilicus and bilat lower abd clean dry and intact. Mild tenderness, most significant mid epigsatric, no RGR. JAYDA drain, right lower quadrant with sero-sanguinous output.

## 2017-05-26 NOTE — ED STATDOCS - PMH
Arthritis  b/l knees  Breast nodule  left breast  Cholelithiasis    H/O goiter    Hyperlipidemia    Hypertension    Hypothyroid    Kidney mass  right  Obese

## 2017-05-26 NOTE — ED STATDOCS - NS ED MD SCRIBE ATTENDING SCRIBE SECTIONS
PROGRESS NOTE/PHYSICAL EXAM/RESULTS/REVIEW OF SYSTEMS/DISPOSITION/VITAL SIGNS( Pullset)/PAST MEDICAL/SURGICAL/SOCIAL HISTORY/HISTORY OF PRESENT ILLNESS

## 2017-05-27 VITALS
HEART RATE: 87 BPM | SYSTOLIC BLOOD PRESSURE: 123 MMHG | DIASTOLIC BLOOD PRESSURE: 74 MMHG | RESPIRATION RATE: 18 BRPM | OXYGEN SATURATION: 99 %

## 2017-05-27 LAB
APPEARANCE UR: CLEAR — SIGNIFICANT CHANGE UP
BACTERIA # UR AUTO: (no result)
BILIRUB UR-MCNC: NEGATIVE — SIGNIFICANT CHANGE UP
COLOR SPEC: YELLOW — SIGNIFICANT CHANGE UP
DIFF PNL FLD: (no result)
EPI CELLS # UR: SIGNIFICANT CHANGE UP
GLUCOSE UR QL: NEGATIVE MG/DL — SIGNIFICANT CHANGE UP
KETONES UR-MCNC: NEGATIVE — SIGNIFICANT CHANGE UP
LEUKOCYTE ESTERASE UR-ACNC: (no result)
NITRITE UR-MCNC: NEGATIVE — SIGNIFICANT CHANGE UP
PH UR: 7 — SIGNIFICANT CHANGE UP (ref 5–8)
PROT UR-MCNC: 30 MG/DL
RBC CASTS # UR COMP ASSIST: >50 /HPF (ref 0–4)
SP GR SPEC: 1.01 — SIGNIFICANT CHANGE UP (ref 1.01–1.02)
TROPONIN I SERPL-MCNC: <0.015 NG/ML — SIGNIFICANT CHANGE UP (ref 0.01–0.04)
UROBILINOGEN FLD QL: NEGATIVE MG/DL — SIGNIFICANT CHANGE UP
WBC UR QL: (no result)

## 2017-05-27 RX ORDER — METRONIDAZOLE 500 MG
500 TABLET ORAL ONCE
Qty: 0 | Refills: 0 | Status: COMPLETED | OUTPATIENT
Start: 2017-05-27 | End: 2017-05-27

## 2017-05-27 RX ORDER — CIPROFLOXACIN LACTATE 400MG/40ML
500 VIAL (ML) INTRAVENOUS ONCE
Qty: 0 | Refills: 0 | Status: COMPLETED | OUTPATIENT
Start: 2017-05-27 | End: 2017-05-27

## 2017-05-27 RX ADMIN — Medication 500 MILLIGRAM(S): at 01:31

## 2017-05-27 NOTE — ED ADULT NURSE REASSESSMENT NOTE - NS ED NURSE REASSESS COMMENT FT1
pt is sleeping in bed comfortably at this time. pt is awaiting results from CT scan VSS, Comfort and safety measures maintained. Will continue to monitor pt.

## 2017-05-27 NOTE — ED ADULT NURSE REASSESSMENT NOTE - COMFORT CARE
darkened lights/wait time explained
warm blanket provided/plan of care explained
plan of care explained

## 2017-05-29 LAB
BACTERIA BLD CULT: SIGNIFICANT CHANGE UP
BACTERIA BLD CULT: SIGNIFICANT CHANGE UP

## 2017-05-30 ENCOUNTER — APPOINTMENT (OUTPATIENT)
Dept: UROLOGY | Facility: CLINIC | Age: 57
End: 2017-05-30

## 2017-05-30 VITALS
TEMPERATURE: 98.9 F | RESPIRATION RATE: 17 BRPM | HEART RATE: 94 BPM | SYSTOLIC BLOOD PRESSURE: 114 MMHG | DIASTOLIC BLOOD PRESSURE: 72 MMHG

## 2017-05-30 DIAGNOSIS — Z85.528 PERSONAL HISTORY OF OTHER MALIGNANT NEOPLASM OF KIDNEY: ICD-10-CM

## 2017-05-31 LAB
CULTURE RESULTS: SIGNIFICANT CHANGE UP
SPECIMEN SOURCE: SIGNIFICANT CHANGE UP

## 2017-06-01 LAB
CULTURE RESULTS: SIGNIFICANT CHANGE UP
SPECIMEN SOURCE: SIGNIFICANT CHANGE UP

## 2017-06-01 NOTE — H&P PST ADULT - NSANTHSNORERD_ENT_A_CORE
Pre-Visit Chart Review  For Appointment Scheduled on 06/02/2017    Health Maintenance Due   Topic Date Due    Lipid Panel  04/06/2017                      No

## 2017-06-13 ENCOUNTER — APPOINTMENT (OUTPATIENT)
Dept: SURGICAL ONCOLOGY | Facility: CLINIC | Age: 57
End: 2017-06-13

## 2017-06-13 VITALS
RESPIRATION RATE: 15 BRPM | BODY MASS INDEX: 40.85 KG/M2 | HEART RATE: 79 BPM | WEIGHT: 222 LBS | HEIGHT: 62 IN | SYSTOLIC BLOOD PRESSURE: 135 MMHG | DIASTOLIC BLOOD PRESSURE: 81 MMHG | OXYGEN SATURATION: 98 %

## 2017-06-13 DIAGNOSIS — K80.10 CALCULUS OF GALLBLADDER WITH CHRONIC CHOLECYSTITIS W/OUT OBSTRUCTION: ICD-10-CM

## 2017-06-15 PROBLEM — K80.10 CHRONIC CALCULOUS CHOLECYSTITIS: Status: ACTIVE | Noted: 2017-05-03

## 2017-06-15 RX ORDER — OXYCODONE 5 MG/1
5 TABLET ORAL
Qty: 20 | Refills: 0 | Status: ACTIVE | COMMUNITY
Start: 2017-05-25

## 2017-06-15 RX ORDER — CIPROFLOXACIN HYDROCHLORIDE 500 MG/1
500 TABLET, FILM COATED ORAL
Qty: 10 | Refills: 0 | Status: COMPLETED | COMMUNITY
Start: 2017-05-25

## 2017-06-15 RX ORDER — METRONIDAZOLE 500 MG/1
500 TABLET ORAL
Qty: 15 | Refills: 0 | Status: COMPLETED | COMMUNITY
Start: 2017-05-25

## 2017-06-15 RX ORDER — TOBRAMYCIN 3 MG/ML
0.3 SOLUTION/ DROPS OPHTHALMIC
Qty: 5 | Refills: 0 | Status: ACTIVE | COMMUNITY
Start: 2016-12-29

## 2017-06-15 RX ORDER — OXYCODONE AND ACETAMINOPHEN 5; 325 MG/1; MG/1
5-325 TABLET ORAL
Qty: 12 | Refills: 0 | Status: ACTIVE | COMMUNITY
Start: 2017-02-06

## 2017-07-17 NOTE — PATIENT PROFILE ADULT. - PMH
Arthritis  b/l knees  Breast nodule  left breast  Hyperlipidemia    Hypertension    Hypothyroid O-Z Plasty Text: The defect edges were debeveled with a #15 scalpel blade.  Given the location of the defect, shape of the defect and the proximity to free margins an O-Z plasty (double transposition flap) was deemed most appropriate.  Using a sterile surgical marker, the appropriate transposition flaps were drawn incorporating the defect and placing the expected incisions within the relaxed skin tension lines where possible.    The area thus outlined was incised deep to adipose tissue with a #15 scalpel blade.  The skin margins were undermined to an appropriate distance in all directions utilizing iris scissors.  Hemostasis was achieved with electrocautery.  The flaps were then transposed into place, one clockwise and the other counterclockwise, and anchored with interrupted buried subcutaneous sutures.

## 2017-08-22 ENCOUNTER — RESULT REVIEW (OUTPATIENT)
Age: 57
End: 2017-08-22

## 2017-08-30 ENCOUNTER — APPOINTMENT (OUTPATIENT)
Dept: MAMMOGRAPHY | Facility: CLINIC | Age: 57
End: 2017-08-30

## 2017-08-30 ENCOUNTER — APPOINTMENT (OUTPATIENT)
Dept: MRI IMAGING | Facility: CLINIC | Age: 57
End: 2017-08-30

## 2017-10-27 NOTE — ASU PREOP CHECKLIST - BMI (KG/M2)
Vaccine Information Statement(s) was given today. This has been reviewed, questions answered, and verbal consent given by Parent for injection(s) and administration of Influenza (Inactivated).    1. Does the patient have a moderate to severe fever?  No  2. Has the patient had a serious reaction to a flu shot before?   No  3. Has the patient ever had [place] Syndrome within 6 weeks of a previous flu shot?  No  4. Does the patient have a serious allergy to eggs?  No  5. Is the patient less that 6 months of age?  No    Patient is eligible to receive the vaccine based on all questions being answered as 'No'.          Patient tolerated without incident. See immunization grid for documentation.  
37.4

## 2017-11-27 ENCOUNTER — FORM ENCOUNTER (OUTPATIENT)
Age: 57
End: 2017-11-27

## 2017-11-28 ENCOUNTER — APPOINTMENT (OUTPATIENT)
Dept: CT IMAGING | Facility: IMAGING CENTER | Age: 57
End: 2017-11-28
Payer: COMMERCIAL

## 2017-11-28 ENCOUNTER — OUTPATIENT (OUTPATIENT)
Dept: OUTPATIENT SERVICES | Facility: HOSPITAL | Age: 57
LOS: 1 days | End: 2017-11-28
Payer: COMMERCIAL

## 2017-11-28 ENCOUNTER — APPOINTMENT (OUTPATIENT)
Dept: RADIOLOGY | Facility: IMAGING CENTER | Age: 57
End: 2017-11-28
Payer: COMMERCIAL

## 2017-11-28 ENCOUNTER — APPOINTMENT (OUTPATIENT)
Dept: UROLOGY | Facility: CLINIC | Age: 57
End: 2017-11-28
Payer: COMMERCIAL

## 2017-11-28 DIAGNOSIS — E89.0 POSTPROCEDURAL HYPOTHYROIDISM: Chronic | ICD-10-CM

## 2017-11-28 DIAGNOSIS — Z98.890 OTHER SPECIFIED POSTPROCEDURAL STATES: Chronic | ICD-10-CM

## 2017-11-28 DIAGNOSIS — N28.89 OTHER SPECIFIED DISORDERS OF KIDNEY AND URETER: ICD-10-CM

## 2017-11-28 DIAGNOSIS — Z85.528 PERSONAL HISTORY OF OTHER MALIGNANT NEOPLASM OF KIDNEY: ICD-10-CM

## 2017-11-28 PROCEDURE — 99214 OFFICE O/P EST MOD 30 MIN: CPT

## 2017-11-28 PROCEDURE — 74170 CT ABD WO CNTRST FLWD CNTRST: CPT | Mod: 26

## 2017-11-28 PROCEDURE — 71020: CPT | Mod: 26

## 2017-11-28 PROCEDURE — 71046 X-RAY EXAM CHEST 2 VIEWS: CPT

## 2017-11-28 PROCEDURE — 82565 ASSAY OF CREATININE: CPT

## 2017-11-28 PROCEDURE — 74170 CT ABD WO CNTRST FLWD CNTRST: CPT

## 2017-12-24 NOTE — ED PROVIDER NOTE - NEUROLOGICAL, MLM
Principal Discharge DX:	Contusion of right thigh, initial encounter Alert and oriented, no focal deficits, no motor or sensory deficits.

## 2018-01-28 ENCOUNTER — EMERGENCY (EMERGENCY)
Facility: HOSPITAL | Age: 58
LOS: 0 days | Discharge: HOME | End: 2018-01-29
Attending: EMERGENCY MEDICINE | Admitting: EMERGENCY MEDICINE
Payer: COMMERCIAL

## 2018-01-28 VITALS — HEIGHT: 67 IN | WEIGHT: 203.93 LBS

## 2018-01-28 DIAGNOSIS — Z98.890 OTHER SPECIFIED POSTPROCEDURAL STATES: Chronic | ICD-10-CM

## 2018-01-28 DIAGNOSIS — R10.9 UNSPECIFIED ABDOMINAL PAIN: ICD-10-CM

## 2018-01-28 DIAGNOSIS — N23 UNSPECIFIED RENAL COLIC: ICD-10-CM

## 2018-01-28 DIAGNOSIS — E89.0 POSTPROCEDURAL HYPOTHYROIDISM: Chronic | ICD-10-CM

## 2018-01-28 PROCEDURE — 99285 EMERGENCY DEPT VISIT HI MDM: CPT | Mod: 25

## 2018-01-28 NOTE — ED ADULT NURSE NOTE - OBJECTIVE STATEMENT
pt c/o of abd pain with nausea. pt describes pain as intense has been going on and off for 1 day, radiating to vagina. denies blood in urine or vaginal discharge. nausea/vomitting present. denies fever

## 2018-01-28 NOTE — ED ADULT TRIAGE NOTE - CHIEF COMPLAINT QUOTE
c/o LLQ pain since last night that improved and returned tonight, c/o weakness, fevers. Denies n/v/d

## 2018-01-29 VITALS
DIASTOLIC BLOOD PRESSURE: 75 MMHG | HEART RATE: 74 BPM | TEMPERATURE: 98 F | OXYGEN SATURATION: 100 % | SYSTOLIC BLOOD PRESSURE: 135 MMHG

## 2018-01-29 LAB
ALBUMIN SERPL ELPH-MCNC: 3.9 G/DL — SIGNIFICANT CHANGE UP (ref 3.3–5)
ALP SERPL-CCNC: 97 U/L — SIGNIFICANT CHANGE UP (ref 40–120)
ALT FLD-CCNC: 20 U/L — SIGNIFICANT CHANGE UP (ref 12–78)
ANION GAP SERPL CALC-SCNC: 6 MMOL/L — SIGNIFICANT CHANGE UP (ref 5–17)
APPEARANCE UR: CLEAR — SIGNIFICANT CHANGE UP
APTT BLD: 36.1 SEC — SIGNIFICANT CHANGE UP (ref 27.5–37.4)
AST SERPL-CCNC: 15 U/L — SIGNIFICANT CHANGE UP (ref 15–37)
BACTERIA # UR AUTO: (no result)
BASOPHILS # BLD AUTO: 0.1 K/UL — SIGNIFICANT CHANGE UP (ref 0–0.2)
BASOPHILS NFR BLD AUTO: 0.6 % — SIGNIFICANT CHANGE UP (ref 0–2)
BILIRUB SERPL-MCNC: 0.5 MG/DL — SIGNIFICANT CHANGE UP (ref 0.2–1.2)
BILIRUB UR-MCNC: NEGATIVE — SIGNIFICANT CHANGE UP
BLD GP AB SCN SERPL QL: SIGNIFICANT CHANGE UP
BUN SERPL-MCNC: 15 MG/DL — SIGNIFICANT CHANGE UP (ref 7–23)
CALCIUM SERPL-MCNC: 9.1 MG/DL — SIGNIFICANT CHANGE UP (ref 8.5–10.1)
CHLORIDE SERPL-SCNC: 106 MMOL/L — SIGNIFICANT CHANGE UP (ref 96–108)
CO2 SERPL-SCNC: 26 MMOL/L — SIGNIFICANT CHANGE UP (ref 22–31)
COLOR SPEC: YELLOW — SIGNIFICANT CHANGE UP
CREAT SERPL-MCNC: 1.14 MG/DL — SIGNIFICANT CHANGE UP (ref 0.5–1.3)
DIFF PNL FLD: (no result)
EOSINOPHIL # BLD AUTO: 0.1 K/UL — SIGNIFICANT CHANGE UP (ref 0–0.5)
EOSINOPHIL NFR BLD AUTO: 0.6 % — SIGNIFICANT CHANGE UP (ref 0–6)
EPI CELLS # UR: (no result)
GLUCOSE SERPL-MCNC: 115 MG/DL — HIGH (ref 70–99)
GLUCOSE UR QL: NEGATIVE MG/DL — SIGNIFICANT CHANGE UP
HCT VFR BLD CALC: 36.8 % — SIGNIFICANT CHANGE UP (ref 34.5–45)
HGB BLD-MCNC: 11.9 G/DL — SIGNIFICANT CHANGE UP (ref 11.5–15.5)
INR BLD: 0.98 RATIO — SIGNIFICANT CHANGE UP (ref 0.88–1.16)
KETONES UR-MCNC: (no result)
LEUKOCYTE ESTERASE UR-ACNC: (no result)
LIDOCAIN IGE QN: 160 U/L — SIGNIFICANT CHANGE UP (ref 73–393)
LYMPHOCYTES # BLD AUTO: 2.2 K/UL — SIGNIFICANT CHANGE UP (ref 1–3.3)
LYMPHOCYTES # BLD AUTO: 20.7 % — SIGNIFICANT CHANGE UP (ref 13–44)
MCHC RBC-ENTMCNC: 28.4 PG — SIGNIFICANT CHANGE UP (ref 27–34)
MCHC RBC-ENTMCNC: 32.2 GM/DL — SIGNIFICANT CHANGE UP (ref 32–36)
MCV RBC AUTO: 88.2 FL — SIGNIFICANT CHANGE UP (ref 80–100)
MONOCYTES # BLD AUTO: 0.8 K/UL — SIGNIFICANT CHANGE UP (ref 0–0.9)
MONOCYTES NFR BLD AUTO: 7.4 % — SIGNIFICANT CHANGE UP (ref 2–14)
NEUTROPHILS # BLD AUTO: 7.5 K/UL — HIGH (ref 1.8–7.4)
NEUTROPHILS NFR BLD AUTO: 70.8 % — SIGNIFICANT CHANGE UP (ref 43–77)
NITRITE UR-MCNC: NEGATIVE — SIGNIFICANT CHANGE UP
PH UR: 7 — SIGNIFICANT CHANGE UP (ref 5–8)
PLATELET # BLD AUTO: 262 K/UL — SIGNIFICANT CHANGE UP (ref 150–400)
POTASSIUM SERPL-MCNC: 3.4 MMOL/L — LOW (ref 3.5–5.3)
POTASSIUM SERPL-SCNC: 3.4 MMOL/L — LOW (ref 3.5–5.3)
PROT SERPL-MCNC: 7.8 GM/DL — SIGNIFICANT CHANGE UP (ref 6–8.3)
PROT UR-MCNC: 15 MG/DL
PROTHROM AB SERPL-ACNC: 10.6 SEC — SIGNIFICANT CHANGE UP (ref 9.8–12.7)
RBC # BLD: 4.18 M/UL — SIGNIFICANT CHANGE UP (ref 3.8–5.2)
RBC # FLD: 13.5 % — SIGNIFICANT CHANGE UP (ref 10.3–14.5)
RBC CASTS # UR COMP ASSIST: >50 /HPF (ref 0–4)
SODIUM SERPL-SCNC: 138 MMOL/L — SIGNIFICANT CHANGE UP (ref 135–145)
SP GR SPEC: 1.01 — SIGNIFICANT CHANGE UP (ref 1.01–1.02)
TYPE + AB SCN PNL BLD: SIGNIFICANT CHANGE UP
UROBILINOGEN FLD QL: NEGATIVE MG/DL — SIGNIFICANT CHANGE UP
WBC # BLD: 10.6 K/UL — HIGH (ref 3.8–10.5)
WBC # FLD AUTO: 10.6 K/UL — HIGH (ref 3.8–10.5)
WBC UR QL: SIGNIFICANT CHANGE UP

## 2018-01-29 PROCEDURE — 74176 CT ABD & PELVIS W/O CONTRAST: CPT | Mod: 26

## 2018-01-29 PROCEDURE — 71045 X-RAY EXAM CHEST 1 VIEW: CPT | Mod: 26

## 2018-01-29 RX ORDER — TAMSULOSIN HYDROCHLORIDE 0.4 MG/1
0.4 CAPSULE ORAL ONCE
Qty: 0 | Refills: 0 | Status: COMPLETED | OUTPATIENT
Start: 2018-01-29 | End: 2018-01-29

## 2018-01-29 RX ORDER — MORPHINE SULFATE 50 MG/1
4 CAPSULE, EXTENDED RELEASE ORAL ONCE
Qty: 0 | Refills: 0 | Status: DISCONTINUED | OUTPATIENT
Start: 2018-01-29 | End: 2018-01-29

## 2018-01-29 RX ORDER — KETOROLAC TROMETHAMINE 30 MG/ML
1 SYRINGE (ML) INJECTION
Qty: 9 | Refills: 0 | OUTPATIENT
Start: 2018-01-29

## 2018-01-29 RX ORDER — ONDANSETRON 8 MG/1
4 TABLET, FILM COATED ORAL ONCE
Qty: 0 | Refills: 0 | Status: COMPLETED | OUTPATIENT
Start: 2018-01-29 | End: 2018-01-29

## 2018-01-29 RX ORDER — SODIUM CHLORIDE 9 MG/ML
2000 INJECTION INTRAMUSCULAR; INTRAVENOUS; SUBCUTANEOUS ONCE
Qty: 0 | Refills: 0 | Status: COMPLETED | OUTPATIENT
Start: 2018-01-29 | End: 2018-01-29

## 2018-01-29 RX ORDER — TAMSULOSIN HYDROCHLORIDE 0.4 MG/1
1 CAPSULE ORAL
Qty: 3 | Refills: 0 | OUTPATIENT
Start: 2018-01-29

## 2018-01-29 RX ORDER — SODIUM CHLORIDE 9 MG/ML
3 INJECTION INTRAMUSCULAR; INTRAVENOUS; SUBCUTANEOUS ONCE
Qty: 0 | Refills: 0 | Status: COMPLETED | OUTPATIENT
Start: 2018-01-29 | End: 2018-01-29

## 2018-01-29 RX ORDER — KETOROLAC TROMETHAMINE 30 MG/ML
30 SYRINGE (ML) INJECTION ONCE
Qty: 0 | Refills: 0 | Status: DISCONTINUED | OUTPATIENT
Start: 2018-01-29 | End: 2018-01-29

## 2018-01-29 RX ADMIN — MORPHINE SULFATE 4 MILLIGRAM(S): 50 CAPSULE, EXTENDED RELEASE ORAL at 00:51

## 2018-01-29 RX ADMIN — SODIUM CHLORIDE 3 MILLILITER(S): 9 INJECTION INTRAMUSCULAR; INTRAVENOUS; SUBCUTANEOUS at 00:51

## 2018-01-29 RX ADMIN — Medication 30 MILLIGRAM(S): at 03:32

## 2018-01-29 RX ADMIN — SODIUM CHLORIDE 2000 MILLILITER(S): 9 INJECTION INTRAMUSCULAR; INTRAVENOUS; SUBCUTANEOUS at 00:51

## 2018-01-29 RX ADMIN — TAMSULOSIN HYDROCHLORIDE 0.4 MILLIGRAM(S): 0.4 CAPSULE ORAL at 03:32

## 2018-01-29 RX ADMIN — ONDANSETRON 4 MILLIGRAM(S): 8 TABLET, FILM COATED ORAL at 00:51

## 2018-01-29 NOTE — ED PROVIDER NOTE - OBJECTIVE STATEMENT
Pt. is a 58 yo F with a hx of mass on right kidney with partial nephrectomy, cholecystectomy BIB  for 1 day of lower abdominal pain on left side with radiation to vagina.  Pt. denies vaginal bleeding and vaginal discharge.  Pt. states urinating is painful and difficult with slow trickle of urine.  Denies hematuria or fever.  + nausea, + dry heaving.

## 2019-01-01 NOTE — ED PROVIDER NOTE - CONTEXT
unknown I will SWITCH the dose or number of times a day I take the medications listed below when I get home from the hospital:  None

## 2019-05-14 NOTE — H&P PST ADULT - VENOUS THROMBOEMBOLISM
Yes take the last 3 prednisone 20 mg daily    Start advair 500/50 1 puff bid x 1 month    Call with update on Friday   no

## 2019-06-20 NOTE — PATIENT PROFILE ADULT. - FUNCTIONAL LEVEL PRIOR: AMBULATION
The patient phoned the office and is requesting an order for a mask that covers her nose only (nasal mask). Not a full face mask or nasal pillow. She states that she has an appointment on tomorrow with [facility]. (0) independent

## 2021-06-20 NOTE — ASU PATIENT PROFILE, ADULT - NS SC CAGE ALCOHOL EYE OPENER
Letter by Marisel Shook CHW at      Author: Marisel Shook CHW Service: -- Author Type: --    Filed:  Encounter Date: 1/31/2020 Status: (Other)         Dear Patsy Santamaria,                                                                         You were recently referred to the [facility]'s Clinic Care Coordination service.  This is a service offered through your Primary Care Clinic which can help you access resources, services in regard to your health and well-being goals. The clinic Community Health Worker has placed two calls to you to discuss the nature of this service and to offer enrollment.  If you are interested in learning more about Clinic Care Coordination, please call your Primary Care Clinic's Community Health Worker, Dean, at [phone].                                                    Sincerely,                                                                                    TAVO Kellogg                                                                                          Clinic Care Coordination                                                  [facility]                                Phone: [phone]          no

## 2022-01-24 NOTE — DISCHARGE NOTE ADULT - PATIENT PORTAL LINK FT
no edema,  no murmurs,  regular rate and rhythm , no edema. “You can access the FollowHealth Patient Portal, offered by [facility], by registering with the following website: [email or web address]”

## 2022-09-15 NOTE — PATIENT PROFILE ADULT. - WHEN FALL OCCURRED
S/w pt   S/p LESI 8/11 which provided 90% relief  Pt states relief has all worn off at this point and he would like to repeat it  Please advise if OK to schedule repeat injection unable to determine

## 2023-11-21 NOTE — ED ADULT NURSE NOTE - ED STAT RN HANDOFF TIME
Add 50025 Cpt? (Important Note: In 2017 The Use Of 34602 Is Being Tracked By Cms To Determine Future Global Period Reimbursement For Global Periods): yes Detail Level: Detailed 07:02